# Patient Record
Sex: FEMALE | Race: WHITE | NOT HISPANIC OR LATINO | URBAN - METROPOLITAN AREA
[De-identification: names, ages, dates, MRNs, and addresses within clinical notes are randomized per-mention and may not be internally consistent; named-entity substitution may affect disease eponyms.]

---

## 2022-02-25 ENCOUNTER — INPATIENT (INPATIENT)
Facility: HOSPITAL | Age: 37
LOS: 17 days | Discharge: HOME | End: 2022-03-15
Attending: PSYCHIATRY & NEUROLOGY | Admitting: PSYCHIATRY & NEUROLOGY
Payer: MEDICARE

## 2022-02-25 VITALS
SYSTOLIC BLOOD PRESSURE: 141 MMHG | TEMPERATURE: 98 F | RESPIRATION RATE: 16 BRPM | HEART RATE: 98 BPM | OXYGEN SATURATION: 100 % | DIASTOLIC BLOOD PRESSURE: 90 MMHG

## 2022-02-25 DIAGNOSIS — F43.10 POST-TRAUMATIC STRESS DISORDER, UNSPECIFIED: ICD-10-CM

## 2022-02-25 DIAGNOSIS — F29 UNSPECIFIED PSYCHOSIS NOT DUE TO A SUBSTANCE OR KNOWN PHYSIOLOGICAL CONDITION: ICD-10-CM

## 2022-02-25 DIAGNOSIS — F25.0 SCHIZOAFFECTIVE DISORDER, BIPOLAR TYPE: ICD-10-CM

## 2022-02-25 LAB
ALBUMIN SERPL ELPH-MCNC: 4.7 G/DL — SIGNIFICANT CHANGE UP (ref 3.3–5)
ALP SERPL-CCNC: 46 U/L — SIGNIFICANT CHANGE UP (ref 40–120)
ALT FLD-CCNC: 9 U/L — SIGNIFICANT CHANGE UP (ref 4–33)
AMPHET UR-MCNC: NEGATIVE — SIGNIFICANT CHANGE UP
ANION GAP SERPL CALC-SCNC: 13 MMOL/L — SIGNIFICANT CHANGE UP (ref 7–14)
APAP SERPL-MCNC: <10 UG/ML — LOW (ref 15–25)
APPEARANCE UR: CLEAR — SIGNIFICANT CHANGE UP
AST SERPL-CCNC: 13 U/L — SIGNIFICANT CHANGE UP (ref 4–32)
B PERT DNA SPEC QL NAA+PROBE: SIGNIFICANT CHANGE UP
B PERT+PARAPERT DNA PNL SPEC NAA+PROBE: SIGNIFICANT CHANGE UP
BARBITURATES UR SCN-MCNC: NEGATIVE — SIGNIFICANT CHANGE UP
BASOPHILS # BLD AUTO: 0.05 K/UL — SIGNIFICANT CHANGE UP (ref 0–0.2)
BASOPHILS NFR BLD AUTO: 0.7 % — SIGNIFICANT CHANGE UP (ref 0–2)
BENZODIAZ UR-MCNC: NEGATIVE — SIGNIFICANT CHANGE UP
BILIRUB SERPL-MCNC: 0.8 MG/DL — SIGNIFICANT CHANGE UP (ref 0.2–1.2)
BILIRUB UR-MCNC: NEGATIVE — SIGNIFICANT CHANGE UP
BORDETELLA PARAPERTUSSIS (RAPRVP): SIGNIFICANT CHANGE UP
BUN SERPL-MCNC: 11 MG/DL — SIGNIFICANT CHANGE UP (ref 7–23)
C PNEUM DNA SPEC QL NAA+PROBE: SIGNIFICANT CHANGE UP
CALCIUM SERPL-MCNC: 9.6 MG/DL — SIGNIFICANT CHANGE UP (ref 8.4–10.5)
CHLORIDE SERPL-SCNC: 102 MMOL/L — SIGNIFICANT CHANGE UP (ref 98–107)
CO2 SERPL-SCNC: 22 MMOL/L — SIGNIFICANT CHANGE UP (ref 22–31)
COCAINE METAB.OTHER UR-MCNC: NEGATIVE — SIGNIFICANT CHANGE UP
COLOR SPEC: COLORLESS — SIGNIFICANT CHANGE UP
COVID-19 SPIKE DOMAIN AB INTERP: NEGATIVE — SIGNIFICANT CHANGE UP
COVID-19 SPIKE DOMAIN ANTIBODY RESULT: 0.4 U/ML — SIGNIFICANT CHANGE UP
CREAT SERPL-MCNC: 0.72 MG/DL — SIGNIFICANT CHANGE UP (ref 0.5–1.3)
CREATININE URINE RESULT, DAU: 27 MG/DL — SIGNIFICANT CHANGE UP
DIFF PNL FLD: NEGATIVE — SIGNIFICANT CHANGE UP
EOSINOPHIL # BLD AUTO: 0.06 K/UL — SIGNIFICANT CHANGE UP (ref 0–0.5)
EOSINOPHIL NFR BLD AUTO: 0.9 % — SIGNIFICANT CHANGE UP (ref 0–6)
ETHANOL SERPL-MCNC: <10 MG/DL — SIGNIFICANT CHANGE UP
FLUAV SUBTYP SPEC NAA+PROBE: SIGNIFICANT CHANGE UP
FLUBV RNA SPEC QL NAA+PROBE: SIGNIFICANT CHANGE UP
GLUCOSE SERPL-MCNC: 104 MG/DL — HIGH (ref 70–99)
GLUCOSE UR QL: NEGATIVE — SIGNIFICANT CHANGE UP
HADV DNA SPEC QL NAA+PROBE: SIGNIFICANT CHANGE UP
HCG SERPL-ACNC: <5 MIU/ML — SIGNIFICANT CHANGE UP
HCOV 229E RNA SPEC QL NAA+PROBE: SIGNIFICANT CHANGE UP
HCOV HKU1 RNA SPEC QL NAA+PROBE: SIGNIFICANT CHANGE UP
HCOV NL63 RNA SPEC QL NAA+PROBE: SIGNIFICANT CHANGE UP
HCOV OC43 RNA SPEC QL NAA+PROBE: SIGNIFICANT CHANGE UP
HCT VFR BLD CALC: 41.6 % — SIGNIFICANT CHANGE UP (ref 34.5–45)
HGB BLD-MCNC: 14 G/DL — SIGNIFICANT CHANGE UP (ref 11.5–15.5)
HMPV RNA SPEC QL NAA+PROBE: SIGNIFICANT CHANGE UP
HPIV1 RNA SPEC QL NAA+PROBE: SIGNIFICANT CHANGE UP
HPIV2 RNA SPEC QL NAA+PROBE: SIGNIFICANT CHANGE UP
HPIV3 RNA SPEC QL NAA+PROBE: SIGNIFICANT CHANGE UP
HPIV4 RNA SPEC QL NAA+PROBE: SIGNIFICANT CHANGE UP
IANC: 4.58 K/UL — SIGNIFICANT CHANGE UP (ref 1.5–8.5)
IMM GRANULOCYTES NFR BLD AUTO: 0.3 % — SIGNIFICANT CHANGE UP (ref 0–1.5)
KETONES UR-MCNC: NEGATIVE — SIGNIFICANT CHANGE UP
LEUKOCYTE ESTERASE UR-ACNC: NEGATIVE — SIGNIFICANT CHANGE UP
LYMPHOCYTES # BLD AUTO: 1.68 K/UL — SIGNIFICANT CHANGE UP (ref 1–3.3)
LYMPHOCYTES # BLD AUTO: 24.2 % — SIGNIFICANT CHANGE UP (ref 13–44)
M PNEUMO DNA SPEC QL NAA+PROBE: SIGNIFICANT CHANGE UP
MCHC RBC-ENTMCNC: 31.9 PG — SIGNIFICANT CHANGE UP (ref 27–34)
MCHC RBC-ENTMCNC: 33.7 GM/DL — SIGNIFICANT CHANGE UP (ref 32–36)
MCV RBC AUTO: 94.8 FL — SIGNIFICANT CHANGE UP (ref 80–100)
METHADONE UR-MCNC: NEGATIVE — SIGNIFICANT CHANGE UP
MONOCYTES # BLD AUTO: 0.56 K/UL — SIGNIFICANT CHANGE UP (ref 0–0.9)
MONOCYTES NFR BLD AUTO: 8.1 % — SIGNIFICANT CHANGE UP (ref 2–14)
NEUTROPHILS # BLD AUTO: 4.58 K/UL — SIGNIFICANT CHANGE UP (ref 1.8–7.4)
NEUTROPHILS NFR BLD AUTO: 65.8 % — SIGNIFICANT CHANGE UP (ref 43–77)
NITRITE UR-MCNC: NEGATIVE — SIGNIFICANT CHANGE UP
NRBC # BLD: 0 /100 WBCS — SIGNIFICANT CHANGE UP
NRBC # FLD: 0 K/UL — SIGNIFICANT CHANGE UP
OPIATES UR-MCNC: NEGATIVE — SIGNIFICANT CHANGE UP
OXYCODONE UR-MCNC: NEGATIVE — SIGNIFICANT CHANGE UP
PCP SPEC-MCNC: SIGNIFICANT CHANGE UP
PCP UR-MCNC: NEGATIVE — SIGNIFICANT CHANGE UP
PH UR: 7.5 — SIGNIFICANT CHANGE UP (ref 5–8)
PLATELET # BLD AUTO: 285 K/UL — SIGNIFICANT CHANGE UP (ref 150–400)
POTASSIUM SERPL-MCNC: 4.5 MMOL/L — SIGNIFICANT CHANGE UP (ref 3.5–5.3)
POTASSIUM SERPL-SCNC: 4.5 MMOL/L — SIGNIFICANT CHANGE UP (ref 3.5–5.3)
PROT SERPL-MCNC: 7.2 G/DL — SIGNIFICANT CHANGE UP (ref 6–8.3)
PROT UR-MCNC: NEGATIVE — SIGNIFICANT CHANGE UP
RAPID RVP RESULT: SIGNIFICANT CHANGE UP
RBC # BLD: 4.39 M/UL — SIGNIFICANT CHANGE UP (ref 3.8–5.2)
RBC # FLD: 12.2 % — SIGNIFICANT CHANGE UP (ref 10.3–14.5)
RSV RNA SPEC QL NAA+PROBE: SIGNIFICANT CHANGE UP
RV+EV RNA SPEC QL NAA+PROBE: SIGNIFICANT CHANGE UP
SALICYLATES SERPL-MCNC: <0.3 MG/DL — LOW (ref 15–30)
SARS-COV-2 IGG+IGM SERPL QL IA: 0.4 U/ML — SIGNIFICANT CHANGE UP
SARS-COV-2 IGG+IGM SERPL QL IA: NEGATIVE — SIGNIFICANT CHANGE UP
SARS-COV-2 RNA SPEC QL NAA+PROBE: SIGNIFICANT CHANGE UP
SODIUM SERPL-SCNC: 137 MMOL/L — SIGNIFICANT CHANGE UP (ref 135–145)
SP GR SPEC: 1.01 — SIGNIFICANT CHANGE UP (ref 1–1.05)
THC UR QL: NEGATIVE — SIGNIFICANT CHANGE UP
TOXICOLOGY SCREEN, DRUGS OF ABUSE, SERUM RESULT: SIGNIFICANT CHANGE UP
TSH SERPL-MCNC: 1.4 UIU/ML — SIGNIFICANT CHANGE UP (ref 0.27–4.2)
UROBILINOGEN FLD QL: SIGNIFICANT CHANGE UP
WBC # BLD: 6.95 K/UL — SIGNIFICANT CHANGE UP (ref 3.8–10.5)
WBC # FLD AUTO: 6.95 K/UL — SIGNIFICANT CHANGE UP (ref 3.8–10.5)

## 2022-02-25 PROCEDURE — 93010 ELECTROCARDIOGRAM REPORT: CPT

## 2022-02-25 PROCEDURE — 99285 EMERGENCY DEPT VISIT HI MDM: CPT | Mod: 25

## 2022-02-25 RX ORDER — VENLAFAXINE HCL 75 MG
37.5 CAPSULE, EXT RELEASE 24 HR ORAL DAILY
Refills: 0 | Status: DISCONTINUED | OUTPATIENT
Start: 2022-02-25 | End: 2022-02-28

## 2022-02-25 RX ORDER — HALOPERIDOL DECANOATE 100 MG/ML
5 INJECTION INTRAMUSCULAR EVERY 6 HOURS
Refills: 0 | Status: DISCONTINUED | OUTPATIENT
Start: 2022-02-25 | End: 2022-03-15

## 2022-02-25 RX ORDER — DIPHENHYDRAMINE HCL 50 MG
50 CAPSULE ORAL EVERY 6 HOURS
Refills: 0 | Status: DISCONTINUED | OUTPATIENT
Start: 2022-02-25 | End: 2022-03-15

## 2022-02-25 RX ORDER — DIPHENHYDRAMINE HCL 50 MG
50 CAPSULE ORAL ONCE
Refills: 0 | Status: DISCONTINUED | OUTPATIENT
Start: 2022-02-25 | End: 2022-03-15

## 2022-02-25 RX ORDER — VENLAFAXINE HCL 75 MG
37.5 CAPSULE, EXT RELEASE 24 HR ORAL DAILY
Refills: 0 | Status: DISCONTINUED | OUTPATIENT
Start: 2022-02-25 | End: 2022-02-25

## 2022-02-25 RX ORDER — ARIPIPRAZOLE 15 MG/1
15 TABLET ORAL DAILY
Refills: 0 | Status: DISCONTINUED | OUTPATIENT
Start: 2022-02-25 | End: 2022-03-01

## 2022-02-25 RX ORDER — HALOPERIDOL DECANOATE 100 MG/ML
5 INJECTION INTRAMUSCULAR ONCE
Refills: 0 | Status: DISCONTINUED | OUTPATIENT
Start: 2022-02-25 | End: 2022-03-15

## 2022-02-25 RX ADMIN — Medication 2 MILLIGRAM(S): at 21:41

## 2022-02-25 RX ADMIN — Medication 2 MILLIGRAM(S): at 12:50

## 2022-02-25 RX ADMIN — ARIPIPRAZOLE 15 MILLIGRAM(S): 15 TABLET ORAL at 21:41

## 2022-02-25 NOTE — ED BEHAVIORAL HEALTH ASSESSMENT NOTE - OTHER PAST PSYCHIATRIC HISTORY (INCLUDE DETAILS REGARDING ONSET, COURSE OF ILLNESS, INPATIENT/OUTPATIENT TREATMENT)
Multiple psych hospitalizations. Last one in 10/2021 in [place] for one week  Currently in treatment [facility] in [place], [phone]

## 2022-02-25 NOTE — ED PROVIDER NOTE - CROS ED ROS STATEMENT
I spoke with pt and she staes she has been taking abx since 7/19/19 all other ROS negative except as per HPI

## 2022-02-25 NOTE — ED BEHAVIORAL HEALTH ASSESSMENT NOTE - RISK ASSESSMENT
High Acute Suicide Risk risk factors include recent aggression, recent non-adherence with meds, psychosis with recent suicidal thoughts , not caring for self, disorganized thought process and insomnia.   protective factors include a supportive family, good access to treatment and no substance use.   pt elevated harm to others and self and requires hospitalization.

## 2022-02-25 NOTE — ED PROVIDER NOTE - OBJECTIVE STATEMENT
37 y/o  F BIBA secondary to dena and paranoia .  Appears internally preoccupied. Denies SI/HI/AH/VH. Denies  falling, punching or kicking any objects. Denies pain, SOB, fever, chills, chest/abdominal discomfort. No evidence of physical  injuries, broken skin  or deformities. Denies recent use of alcohol or illicit drugs.

## 2022-02-25 NOTE — ED BEHAVIORAL HEALTH ASSESSMENT NOTE - PSYCHIATRIC ISSUES AND PLAN (INCLUDE STANDING AND PRN MEDICATION)
c/w effexor 37.5mg po daily, Abilify 15mg po daily. PRNS: Haldol 5mg po/im q6hrs, ativan 2mg po/im q6hrs prn and benadryl 50mg po/im q6hrs prn

## 2022-02-25 NOTE — ED BEHAVIORAL HEALTH ASSESSMENT NOTE - DESCRIPTION
labile, crying hysterically at times and then blunted affect. Received Ativan 2mg po 1x dose  Vital Signs Last 24 Hrs  T(C): 36.4 (25 Feb 2022 12:02), Max: 36.4 (25 Feb 2022 12:02)  T(F): 97.5 (25 Feb 2022 12:02), Max: 97.5 (25 Feb 2022 12:02)  HR: 98 (25 Feb 2022 12:02) (98 - 98)  BP: 141/90 (25 Feb 2022 12:02) (141/90 - 141/90)  BP(mean): --  RR: 16 (25 Feb 2022 12:02) (16 - 16)  SpO2: 100% (25 Feb 2022 12:02) (100% - 100%) none lives in [place] with , no children, unemployed for the last 10 years

## 2022-02-25 NOTE — ED BEHAVIORAL HEALTH ASSESSMENT NOTE - DIFFERENTIAL
schizoaffective disorder vs. schizophrenia vs. bipolar with psychotic features vs. mdd with psychosis

## 2022-02-25 NOTE — ED ADULT TRIAGE NOTE - CHIEF COMPLAINT QUOTE
Pt states that she has been getting "messages from the government", pt will not elaborate, appears preoccupied and paranoid.  PMH schizo-affective, hospitalized in [place] 10/21
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
## 2022-02-25 NOTE — ED BEHAVIORAL HEALTH ASSESSMENT NOTE - SUMMARY
36YR old  F, domiciled with spouse in [place], unemployed with a past psych hx of schizoaffective disorder, PTSD, multiple hospitalizations, Last Hospitalization was at [facility] in Oct 2021 for a week, currently in outpatient treatment at [facility] in [place], [place] no past SA, no nssib, no medical hx, no substance use was BIB family for bizarre and delusional behavior.  Pt presenting with symptoms of active psychosis including paranoid delusions, disorganized thought process and most likely having AH in the context of med non-adherence. Patient has not been functioning at her baseline, not sleeping, has been aggressive with family and at this time is presenting as an acute danger towards others and will require hospitalization.

## 2022-02-25 NOTE — ED ADULT NURSE NOTE - OBJECTIVE STATEMENT
Pt BIB family for worsening paranoia.  Pt states that she's here because the government sanctioned(punished) her.  Pt is very guarded and would not expound upon what that meant, tearful.  Pt states that the sanctions are causing her to think about killing herself but she denies auditory and visual hallucinations.  Pt denies current SI/HI, AH/VH, ETOH, substance use. Pt BIB family for worsening paranoia.  Pt states that she's here because the government sanctioned(punished) her.  Pt is very guarded and would not expound upon what that meant, tearful.  Pt states that the sanctions are causing her to think about killing herself but she denies auditory and visual hallucinations and said she's not schizophrenic.  Pt denies current SI/HI, AH/VH, ETOH, substance use.

## 2022-02-25 NOTE — ED BEHAVIORAL HEALTH NOTE - BEHAVIORAL HEALTH NOTE
COVID Exposure Screen- Patient  1.	*Have you had a COVID-19 test in the last 90 days?  (  ) Yes   (x  ) No   (  ) Unknown- Reason: _____  IF YES PROCEED TO QUESTION #2. IF NO OR UNKNOWN, PLEASE SKIP TO QUESTION #3.  2.	Date of test(s) and result(s): ________  3.	*Have you tested positive for COVID-19 antibodies? (  ) Yes   (x  ) No   (  ) Unknown- Reason: _____  IF YES PROCEED TO QUESTION #4. IF NO or UNKNOWN, PLEASE SKIP TO QUESTION #5.  4.	Date of positive antibody test: ________  5.	*Have you received 2 doses of the COVID-19 vaccine? (  ) Yes   ( x ) No   (  ) Unknown- Reason: _____   IF YES PROCEED TO QUESTION #6. IF NO or UNKNOWN, PLEASE SKIP TO QUESTION #7.  6.	Date of second dose: ________  7.	*In the past 10 days, have you been around anyone with a positive COVID-19 test?* (  ) Yes   (  x) No   (  ) Unknown- Reason: ____  IF YES PROCEED TO QUESTION #8. IF NO or UNKNOWN, PLEASE SKIP TO QUESTION #13.  8.	Were you within 6 feet of them for at least 15 minutes? (  ) Yes   (  ) No   (  ) Unknown- Reason: _____  9.	Have you provided care for them? (  ) Yes   (  ) No   (  ) Unknown- Reason: ______  10.	Have you had direct physical contact with them (touched, hugged, or kissed them)? (  ) Yes   (  ) No    (  ) Unknown- Reason: _____  11.	Have you shared eating or drinking utensils with them? (  ) Yes   (  ) No    (  ) Unknown- Reason: ____  12.	Have they sneezed, coughed, or somehow gotten respiratory droplets on you? (  ) Yes   (  ) No    (  ) Unknown- Reason: ______  13.	*Have you been out of [place] within the past 10 days?* (x  ) Yes   (  ) No   (  ) Unknown- Reason: _____  IF YES PLEASE ANSWER THE FOLLOWING QUESTIONS:  14.	Which state/country have you been to? _[place]_____  15.	Were you there over 24 hours? ( x ) Yes   (  ) No    (  ) Unknown- Reason: ______  16.	Date of return to [facility]: __02/25/22____

## 2022-02-25 NOTE — ED BEHAVIORAL HEALTH ASSESSMENT NOTE - VIOLENCE RISK FACTORS:
Feeling of being under threat and being unable to control threat/Affective dysregulation/Impulsivity/Noncompliance with treatment

## 2022-02-25 NOTE — ED PROVIDER NOTE - CLINICAL SUMMARY MEDICAL DECISION MAKING FREE TEXT BOX
Labs, Urine Tox/UA, EKG  Medical evaluation performed. There is no clinical evidence of intoxication or any acute medical problem requiring immediate intervention. Patient is awaiting psychiatric consultation. Final disposition will be determined by psychiatrist.

## 2022-02-26 RX ADMIN — Medication 50 MILLIGRAM(S): at 09:37

## 2022-02-26 RX ADMIN — Medication 37.5 MILLIGRAM(S): at 08:31

## 2022-02-26 RX ADMIN — ARIPIPRAZOLE 15 MILLIGRAM(S): 15 TABLET ORAL at 08:31

## 2022-02-26 RX ADMIN — HALOPERIDOL DECANOATE 5 MILLIGRAM(S): 100 INJECTION INTRAMUSCULAR at 15:39

## 2022-02-26 RX ADMIN — Medication 2 MILLIGRAM(S): at 16:32

## 2022-02-26 RX ADMIN — Medication 50 MILLIGRAM(S): at 15:39

## 2022-02-26 RX ADMIN — HALOPERIDOL DECANOATE 5 MILLIGRAM(S): 100 INJECTION INTRAMUSCULAR at 09:37

## 2022-02-26 NOTE — BH INPATIENT PSYCHIATRY ASSESSMENT NOTE - HPI (INCLUDE ILLNESS QUALITY, SEVERITY, DURATION, TIMING, CONTEXT, MODIFYING FACTORS, ASSOCIATED SIGNS AND SYMPTOMS)
Patient was seen and evaluated, chart reviewed. Case discussed with nursing team.  On service for this 36 year old  female domiciled with spouse in [place], unemployed with a past psych hx of Schizoaffective disorder, and PTSD.  Patient is hospitalized with psychotic decompensation, increase in bizarre and delusional behaviors. Patient admitted to  on a 9.39 legal status. I have reviewed the initial psychiatric assessment in the electronic medical record, including the history of present illness, past psychiatric history, family/social history (no pertinent changes), and exam, and have confirmed the salient findings dated 22.    As per chart review, transferring records indicated the followinYR old  F, domiciled with spouse in [place], unemployed with a past psych hx of schizoaffective disorder, PTSD, multiple hospitalizations, Last Hospitalization was at [facility] in Oct 2021 for a week, currently in outpatient treatment at [facility] in [place], [place] no past SA, no nssib, no medical hx, no substance use was BIB family for bizarre and delusional behavior.   Patient is labile and tangential during the interview. She starts crying when discussing the government sanctions that have been placed on her and it's leading her to possibly have a "forced suicide". She states the government has placed sanctions on her so she would take her medication and she was ordered by them to come to this hospital. She also admits that she only recently started taking her medication (Effexor 37.5mg and Abilify 15mg po) as she had been off of them for a long time because of "the health issues that she could get from them". Patient states that the government is also watching her through difference cameras planted in her home and around her home. She states they've been controlling her movements (unclear how) and they will "paralyze her legs" or "cause her to shake". Patient states they're inflicting psychological warfare on her and "the war is so severe" but does not state it's been making her feel depressed.   Patient admits that when she was not taking her medications she was not sleeping but since restarting both the medications her sleep has improved. She is endorsing grandiose delusions stating that "I am Covid" and says everytime someone mentions COVID on tv, they're talking about her. She also feels that the COVID vaccine has AIDS in it and will refuse to get it. Patient is denying any SI/I/P at this time and is also denying any HI/I/P. She did at a point, look up to the ceiling and said "STOP PARALYZING ME!" but denied any auditory hallucinations.       On unit:  Information Received From: Chart review and patient interview         Patient was seen and evaluated, chart reviewed. Case discussed with nursing team.  On service for this 36 year old  female domiciled with spouse in [place], unemployed with a past psych hx of Schizoaffective disorder, and PTSD.  Patient is hospitalized with psychotic decompensation, increase in bizarre and delusional behaviors. Patient admitted to  on a 9.39 legal status. I have reviewed the initial psychiatric assessment in the electronic medical record, including the history of present illness, past psychiatric history, family/social history (no pertinent changes), and exam, and have confirmed the salient findings dated 22.    As per chart review, transferring records indicated the followinYR old  F, domiciled with spouse in [place], unemployed with a past psych hx of schizoaffective disorder, PTSD, multiple hospitalizations, Last Hospitalization was at [facility] in Oct 2021 for a week, currently in outpatient treatment at [facility] in [place], [place] no past SA, no nssib, no medical hx, no substance use was BIB family for bizarre and delusional behavior.   Patient is labile and tangential during the interview. She starts crying when discussing the government sanctions that have been placed on her and it's leading her to possibly have a "forced suicide". She states the government has placed sanctions on her so she would take her medication and she was ordered by them to come to this hospital. She also admits that she only recently started taking her medication (Effexor 37.5mg and Abilify 15mg po) as she had been off of them for a long time because of "the health issues that she could get from them". Patient states that the government is also watching her through difference cameras planted in her home and around her home. She states they've been controlling her movements (unclear how) and they will "paralyze her legs" or "cause her to shake". Patient states they're inflicting psychological warfare on her and "the war is so severe" but does not state it's been making her feel depressed.   Patient admits that when she was not taking her medications she was not sleeping but since restarting both the medications her sleep has improved. She is endorsing grandiose delusions stating that "I am Covid" and says everytime someone mentions COVID on tv, they're talking about her. She also feels that the COVID vaccine has AIDS in it and will refuse to get it. Patient is denying any SI/I/P at this time and is also denying any HI/I/P. She did at a point, look up to the ceiling and said "STOP PARALYZING ME!" but denied any auditory hallucinations.       On unit:  Initial interview, patient is followed up for psychotic decompensation.  Chart, medications and admission labs reviewed, with no acute findings.  Patient is discussed with nursing staff. Per nursing report patient remains compliant with all standing medications. Patient remains in good behavioral control, there has been no aggression, no prns for aggression. No significant overnight issues.    Patient is observed in dayroom, walking in holding her bags of belongings.  She is notably guarded upon approach, but agrees to interview. When questioned about her mood pt reports that she feels “anxious”  In regards to psychotic symptoms, there is an extended pause then pt reports “I don’t have a psychiatric problem”.  Attempted to discuss precipitating events leading to current admission and why she is here pt replies “I have sanctions and there severe sanctions from the government”  Patient immediately becomes tearful, she is labile and tangential during the interview. She starts crying when discussing the government sanctions that have been placed on her. She states the government has placed sanctions on her so she would take her medication and she was ordered by the government to come to this hospital. Patient reports “there’s a war and I’m in the middle of the war” Patient is asked between who, she replies “Mitul and all the celebrities they play this game and they use me as a shield and if they stop I’m going to die”   Patient reports the government is also involved in this war and it's leading her to possibly have a "forced suicide". Patient reports that this “war” has been going on for several months “they programed my mind to think about these games” Denies hearing voices. Patient is asked why she thinks she was picked to play these games, to which she replies “because I understand the government in a different way than most people, I know special things, I speak in special code” Patient abruptly stops talking then states “I’m not telling you anymore information about that” Patient then begins to shake her body violently in the chair stating “this is them controlling controling my body, whenever they hear me talking to someone they make me have a seizure so I can stop talking”   Regarding her medication compliance pt reports “I was ordered by the government by the feds to take them because the celebrities complained about me breathing the science”  Patient denies SI/SIB/HI “I would never kill myself  unless the government approved it”  Denies substance abuse. Attempts made to discuss alternative antipsychotic medications, pt reports that her current medications "are approved sanctions by the government, they kept their promise I do't want to change it"     Information Received From: Chart review and patient interview         Patient was seen and evaluated, chart reviewed. Case discussed with nursing team.  On service for this 36 year old  female domiciled with spouse in [place], unemployed with a past psych hx of Schizoaffective disorder, and PTSD.  Patient is hospitalized with psychotic decompensation, increase in bizarre and delusional behaviors. Patient admitted to  on a 9.39 legal status. I have reviewed the initial psychiatric assessment in the electronic medical record, including the history of present illness, past psychiatric history, family/social history (no pertinent changes), and exam, and have confirmed the salient findings dated 22.    As per chart review, transferring records indicated the followinYR old  F, domiciled with spouse in [place], unemployed with a past psych hx of schizoaffective disorder, PTSD, multiple hospitalizations, Last Hospitalization was at [facility] in Oct 2021 for a week, currently in outpatient treatment at [facility] in [place], [place] no past SA, no nssib, no medical hx, no substance use was BIB family for bizarre and delusional behavior.   Patient is labile and tangential during the interview. She starts crying when discussing the government sanctions that have been placed on her and it's leading her to possibly have a "forced suicide". She states the government has placed sanctions on her so she would take her medication and she was ordered by them to come to this hospital. She also admits that she only recently started taking her medication (Effexor 37.5mg and Abilify 15mg po) as she had been off of them for a long time because of "the health issues that she could get from them". Patient states that the government is also watching her through difference cameras planted in her home and around her home. She states they've been controlling her movements (unclear how) and they will "paralyze her legs" or "cause her to shake". Patient states they're inflicting psychological warfare on her and "the war is so severe" but does not state it's been making her feel depressed.   Patient admits that when she was not taking her medications she was not sleeping but since restarting both the medications her sleep has improved. She is endorsing grandiose delusions stating that "I am Covid" and says everytime someone mentions COVID on tv, they're talking about her. She also feels that the COVID vaccine has AIDS in it and will refuse to get it. Patient is denying any SI/I/P at this time and is also denying any HI/I/P. She did at a point, look up to the ceiling and said "STOP PARALYZING ME!" but denied any auditory hallucinations.       On unit:  Initial interview, patient is followed up for psychotic decompensation.  Chart, medications and admission labs reviewed, with no acute findings.  Patient is discussed with nursing staff. Per nursing report patient remains compliant with all standing medications. Patient remains in good behavioral control, there has been no aggression, no prns for aggression. No significant overnight issues.    Patient is observed in dayroom, walking in holding her bags of belongings.  She is notably guarded upon approach, but agrees to interview. When questioned about her mood pt reports that she feels “anxious”  In regards to psychotic symptoms, there is an extended pause then pt reports “I don’t have a psychiatric problem”.  Attempted to discuss precipitating events leading to current admission and why she is here pt replies “I have sanctions and there severe sanctions from the government”  Patient immediately becomes tearful, she is labile and tangential during the interview. She starts crying when discussing the government sanctions that have been placed on her. She states the government has placed sanctions on her so she would take her medication and she was ordered by the government to come to this hospital. Patient reports “there’s a war and I’m in the middle of the war” Patient is asked between who, she replies “Mitul and all the celebrities they play this game and they use me as a shield and if they stop I’m going to die”   Patient reports the government is also involved in this war and it's leading her to possibly have a "forced suicide". Patient reports that this “war” has been going on for several months “they programed my mind to think about these games” Denies hearing voices. Patient is asked why she thinks she was picked to play these games, to which she replies “because I understand the government in a different way than most people, I know special things, I speak in special code” Patient abruptly stops talking then states “I’m not telling you anymore information about that” Patient then begins to shake her body violently in the chair stating “this is them controlling controling my body, whenever they hear me talking to someone they make me have a seizure so I can stop talking”   Regarding her medication compliance pt reports “I was ordered by the government by the feds to take them because the celebrities complained about me breathing the science”  Patient denies SI/SIB/HI “I would never kill myself  unless the government approved it”  Denies substance abuse. Attempts made to discuss alternative antipsychotic medications, pt reports that her current medications "are approved sanctions by the government, they kept their promise I do't want to change it"

## 2022-02-26 NOTE — BH INPATIENT PSYCHIATRY ASSESSMENT NOTE - NSBHCHARTREVIEWVS_PSY_A_CORE FT
Vital Signs Last 24 Hrs  T(C): 36.5 (02-26-22 @ 06:43), Max: 37 (02-25-22 @ 16:50)  T(F): 97.7 (02-26-22 @ 06:43), Max: 98.6 (02-25-22 @ 16:50)  HR: 85 (02-26-22 @ 06:43) (85 - 105)  BP: 136/73 (02-26-22 @ 06:43) (121/78 - 141/90)  BP(mean): 110 (02-25-22 @ 16:50) (110 - 110)  RR: 18 (02-25-22 @ 15:54) (16 - 18)  SpO2: 100% (02-25-22 @ 15:54) (100% - 100%)     Vital Signs Last 24 Hrs  T(C): 36.7 (02-26-22 @ 20:10), Max: 36.7 (02-26-22 @ 20:10)  T(F): 98 (02-26-22 @ 20:10), Max: 98 (02-26-22 @ 20:10)  HR: --  BP: --  BP(mean): --  RR: --  SpO2: --

## 2022-02-26 NOTE — BH INPATIENT PSYCHIATRY ASSESSMENT NOTE - RISK ASSESSMENT
risk factors include recent aggression, recent non-adherence with meds, psychosis with recent suicidal thoughts , not caring for self, disorganized thought process and insomnia.   protective factors include a supportive family, good access to treatment and no substance use.   pt elevated harm to others and self and requires hospitalization.

## 2022-02-26 NOTE — PSYCHIATRIC REHAB INITIAL EVALUATION - NSBHPRRECOMMEND_PSY_ALL_CORE
Writer attempted to meet with patient in order to orient the patient to the unit, provide her with a copy of the unit schedule, and introduce her to department staff and department functions, however the patient was asleep after taking medication. Patient was hospitalized due to worsening psychosis in the context of medication noncompliance. Patient also reported suicidal thoughts, and sx of anxiety. Patient has delusional beliefs that the government is spying on her and controlling her movements. On arrival to the unit, patient was reported to be labile and tearful.   Patient and writer were unable to establish a collaborative rehabilitation goal, therefore an appropriate goal will be assigned to the patient. Psychiatric rehabilitation staff will continue to provide ongoing support and encouragement.
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
## 2022-02-26 NOTE — BH INPATIENT PSYCHIATRY ASSESSMENT NOTE - CURRENT MEDICATION
MEDICATIONS  (STANDING):  ARIPiprazole 15 milliGRAM(s) Oral daily  venlafaxine XR 37.5 milliGRAM(s) Oral daily    MEDICATIONS  (PRN):  diphenhydrAMINE 50 milliGRAM(s) Oral every 6 hours PRN eps ppx/agitation  diphenhydrAMINE Injectable 50 milliGRAM(s) IntraMuscular once PRN eps ppx/severe agitationeps ppx/severe agitation  haloperidol     Tablet 5 milliGRAM(s) Oral every 6 hours PRN agitation  haloperidol    Injectable 5 milliGRAM(s) IntraMuscular once PRN severe agitation  LORazepam     Tablet 2 milliGRAM(s) Oral every 6 hours PRN Agitation  LORazepam   Injectable 2 milliGRAM(s) IntraMuscular Once PRN severe agitation

## 2022-02-26 NOTE — BH INPATIENT PSYCHIATRY ASSESSMENT NOTE - OTHER PAST PSYCHIATRIC HISTORY (INCLUDE DETAILS REGARDING ONSET, COURSE OF ILLNESS, INPATIENT/OUTPATIENT TREATMENT)
Multiple psych hospitalizations. Last one in 10/2021 in [place] for one week  Currently in treatment [facility] in [place], [phone]

## 2022-02-26 NOTE — BH INPATIENT PSYCHIATRY ASSESSMENT NOTE - OTHER
pt denies AH but responds to stimuli extremely bizarre, oddly related, but cooperative during interview

## 2022-02-27 LAB
A1C WITH ESTIMATED AVERAGE GLUCOSE RESULT: 5 % — SIGNIFICANT CHANGE UP (ref 4–5.6)
CHOLEST SERPL-MCNC: 250 MG/DL — HIGH
ESTIMATED AVERAGE GLUCOSE: 97 — SIGNIFICANT CHANGE UP
HDLC SERPL-MCNC: 86 MG/DL — SIGNIFICANT CHANGE UP
LIPID PNL WITH DIRECT LDL SERPL: 154 MG/DL — HIGH
NON HDL CHOLESTEROL: 164 MG/DL — HIGH
TRIGL SERPL-MCNC: 48 MG/DL — SIGNIFICANT CHANGE UP

## 2022-02-27 PROCEDURE — 99232 SBSQ HOSP IP/OBS MODERATE 35: CPT

## 2022-02-27 RX ADMIN — HALOPERIDOL DECANOATE 5 MILLIGRAM(S): 100 INJECTION INTRAMUSCULAR at 09:53

## 2022-02-27 RX ADMIN — Medication 50 MILLIGRAM(S): at 18:01

## 2022-02-27 RX ADMIN — Medication 37.5 MILLIGRAM(S): at 08:16

## 2022-02-27 RX ADMIN — ARIPIPRAZOLE 15 MILLIGRAM(S): 15 TABLET ORAL at 08:16

## 2022-02-27 NOTE — BH INPATIENT PSYCHIATRY PROGRESS NOTE - NSBHCHARTREVIEWVS_PSY_A_CORE FT
Vital Signs Last 24 Hrs  T(C): 36.8 (02-27-22 @ 07:50), Max: 36.8 (02-27-22 @ 07:50)  T(F): 98.2 (02-27-22 @ 07:50), Max: 98.2 (02-27-22 @ 07:50)  HR: --  BP: --  BP(mean): --  RR: --  SpO2: --    Orthostatic VS  02-27-22 @ 07:50  Lying BP: --/-- HR: --  Sitting BP: 118/73 HR: 88  Standing BP: --/-- HR: --  Site: --  Mode: --

## 2022-02-27 NOTE — BH INPATIENT PSYCHIATRY PROGRESS NOTE - NSBHASSESSSUMMFT_PSY_ALL_CORE
Plan:  >Legal: 9.39  >Obs: Routine checks appropriate--visible on the unit, no history of aggression on inpatient unit.  No current SI. No need for CO, patient not expected to pose risk to self or others in controlled inpatient setting.  >Psychiatric Meds: outpatient medication regimen: effexor 37.5mg and Abilify 15mg daily. Observe for tolerability and efficacy. Hold antipsychotics if QTc >500  > Labs: Admission labs were reviewed, unremarkable. Follow up with Medical team as needed.   >Medical Comorbidities: No acute concerns. No consultations needed at this time. Patient with consistently stable VS,. Admission labs reviewed, no acute findings.   >Diet: Regular  >Social: milieu therapy  >Treatment Interventions: Groups and Individual Therapy/CBT, Motivational counseling for substance abuse related issues.   >Dispo: Collateral and dispo planning pending further symptom and medication optimization

## 2022-02-27 NOTE — BH INPATIENT PSYCHIATRY PROGRESS NOTE - NSBHFUPINTERVALHXFT_PSY_A_CORE
Per chart review:  36YR old  F, domiciled with spouse in [place], unemployed with a past psych hx of schizoaffective disorder, PTSD, multiple hospitalizations, Last Hospitalization was at [facility] in Oct 2021 for a week, currently in outpatient treatment at [facility] in [place], [place] no past SA, no nssib, no medical hx, no substance use was BIB family for bizarre and delusional behavior. She also admits that she only recently started taking her medication (Effexor 37.5mg and Abilify 15mg po) as she had been off of them for a long time because of "the health issues that she could get from them". Patient states that the government is also watching her through difference cameras planted in her home and around her home. She states they've been controlling her movements (unclear how) and they will "paralyze her legs" or "cause her to shake". Patient states they're inflicting psychological warfare on her and "the war is so severe" but does not state it's been making her feel depressed. Patient admits that when she was not taking her medications she was not sleeping but since restarting both the medications her sleep has improved. She is endorsing grandiose delusions stating that "I am Covid" and says everytime someone mentions COVID on tv, they're talking about her. She also feels that the COVID vaccine has AIDS in it and will refuse to get it. Patient is denying any SI/I/P at this time and is also denying any HI/I/P. She did at a point, look up to the ceiling and said "STOP PARALYZING ME!" but denied any auditory hallucinations.   On unit:  Patient is followed up for psychotic decompensation.  Chart, medications and admission labs reviewed, with no acute findings.  Patient is discussed with nursing staff. No interval events. Per nursing report patient remains compliant with standing medications. Patient remains in good behavioral control, there has been no aggression, no prns for aggression. Patient is eating and sleeping well.  Patient is observed in dayroom, walking in holding her bags of belongings. Patient remains disorganized, delusional, with ongoing perceptual disturbances. Psychotic symptoms successfully elicited.  Continues to manifest illogical in TP, has impaired reasoning, remains affectively dysregulated, delusional appears internally stimulated. Remains poor insight and judgement. Symptoms continues to impact pts functionality,  +bizarre remarks. Psychotic symptoms evident is same intensity as yesterday.  She states the government has placed sanctions on her so she would take her medication and she was ordered by the government to come to this hospital. Patient reports “there’s a war and I’m in the middle of the war”  Patient reports the government is involved in this war and it's leading her to possibly have a "forced suicide". Patient reports “My purpose here is to stop the war, only I can stop the war” Patient reports that she needs a therapist with high level government clearance to talk to because what she has to reveal is government classified.   Regarding her medication compliance pt reports “I was ordered by the government by the feds to take them because the celebrities complained about me breathing the science”  Patient denies SI/SIB/HI/AVH. Continue to provide therapeutic support.

## 2022-02-28 PROCEDURE — 99232 SBSQ HOSP IP/OBS MODERATE 35: CPT

## 2022-02-28 RX ORDER — VENLAFAXINE HCL 75 MG
75 CAPSULE, EXT RELEASE 24 HR ORAL DAILY
Refills: 0 | Status: DISCONTINUED | OUTPATIENT
Start: 2022-03-01 | End: 2022-03-02

## 2022-02-28 RX ADMIN — HALOPERIDOL DECANOATE 5 MILLIGRAM(S): 100 INJECTION INTRAMUSCULAR at 11:19

## 2022-02-28 RX ADMIN — Medication 37.5 MILLIGRAM(S): at 08:41

## 2022-02-28 RX ADMIN — ARIPIPRAZOLE 15 MILLIGRAM(S): 15 TABLET ORAL at 08:41

## 2022-02-28 RX ADMIN — Medication 2 MILLIGRAM(S): at 15:18

## 2022-02-28 NOTE — BH INPATIENT PSYCHIATRY PROGRESS NOTE - OTHER
extremely bizarre, oddly related, but mostly cooperative during interview AH suspected, but she denies

## 2022-02-28 NOTE — BH INPATIENT PSYCHIATRY PROGRESS NOTE - NSBHCHARTREVIEWVS_PSY_A_CORE FT
Vital Signs Last 24 Hrs  T(C): 36.8 (02-28-22 @ 07:46), Max: 36.8 (02-28-22 @ 07:46)  T(F): 98.2 (02-28-22 @ 07:46), Max: 98.2 (02-28-22 @ 07:46)  HR: 77 (02-28-22 @ 07:46) (77 - 77)  BP: 114/63 (02-28-22 @ 07:46) (114/63 - 114/63)  BP(mean): --  RR: --  SpO2: --    Orthostatic VS  02-27-22 @ 07:50  Lying BP: --/-- HR: --  Sitting BP: 118/73 HR: 88  Standing BP: --/-- HR: --  Site: --  Mode: --
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
## 2022-02-28 NOTE — BH INPATIENT PSYCHIATRY PROGRESS NOTE - CURRENT MEDICATION
MEDICATIONS  (STANDING):  ARIPiprazole 15 milliGRAM(s) Oral daily    MEDICATIONS  (PRN):  diphenhydrAMINE 50 milliGRAM(s) Oral every 6 hours PRN eps ppx/agitation  diphenhydrAMINE Injectable 50 milliGRAM(s) IntraMuscular once PRN eps ppx/severe agitationeps ppx/severe agitation  haloperidol     Tablet 5 milliGRAM(s) Oral every 6 hours PRN agitation  haloperidol    Injectable 5 milliGRAM(s) IntraMuscular once PRN severe agitation  LORazepam     Tablet 2 milliGRAM(s) Oral every 6 hours PRN Agitation  LORazepam   Injectable 2 milliGRAM(s) IntraMuscular Once PRN severe agitation

## 2022-02-28 NOTE — BH INPATIENT PSYCHIATRY PROGRESS NOTE - NSBHASSESSSUMMFT_PSY_ALL_CORE
Pt disorganized, labile, with delusions and depressed mood    Plan:  >Legal: 9.39  >Obs: Routine checks appropriate--visible on the unit, no history of aggression on inpatient unit.  No current SI. No need for CO, patient not expected to pose risk to self or others in controlled inpatient setting.  >Psychiatric Meds: outpatient medication regimen: Effexor XR titrated to 75mg PO daily and Abilify 15mg daily  > Labs: Admission labs were reviewed, unremarkable. Follow up with Medical team as needed.   >Medical Comorbidities: No acute concerns. No consultations needed at this time. Patient with consistently stable VS,. Admission labs reviewed, no acute findings.   >Diet: Regular  >Social: milieu therapy  >Treatment Interventions: Groups and Individual Therapy/CBT, Motivational counseling for substance abuse related issues.   >Dispo: Collateral and dispo planning pending further symptom and medication optimization

## 2022-02-28 NOTE — BH INPATIENT PSYCHIATRY PROGRESS NOTE - NSBHMETABOLIC_PSY_ALL_CORE_FT
BMI:   HbA1c: A1C with Estimated Average Glucose Result: 5.0 % (02-27-22 @ 10:45)    Glucose:   BP: 114/63 (02-28-22 @ 07:46) (114/63 - 136/73)  Lipid Panel: Date/Time: 02-27-22 @ 10:45  Cholesterol, Serum: 250  Direct LDL: --  HDL Cholesterol, Serum: 86  Total Cholesterol/HDL Ration Measurement: --  Triglycerides, Serum: 48

## 2022-02-28 NOTE — BH INPATIENT PSYCHIATRY PROGRESS NOTE - NSBHFUPINTERVALHXFT_PSY_A_CORE
Pt compliant with medication and tolerating it well.  Chart reviewed and case discussed with treatment team.  No events reported overnight.  Pt on interview is labile, tearful and disorganized.  She reports she came to the hospital because she was sanctioned by the government.  She reports the government sent her to the hospital because there is a war in the government and "now the goal is a lot of games."  Pt reports the government is doing "a lot of mental sanctions on me."  Pt reports that since the games started "I am a tool for the war."  Pt reports she was also sent here by the government "to get therapy because my mind is programmed by the games and some people want to stop the games."  Pt reports she cannot answer a lot of questions "because I could die."  Pt talks about how the government is "suiciding" her.  Pt denies any active SI/I/P.  Pt reports "suiciding" means they are "doing trauma science and they turn it on and off."  Pt reports when it is "on, I am traumatized," and when it is "off, I am calm and confident."  Pt denies AH or VH.  Pt reports the [employer] watches her wherever she goes and they are celebrities.  Pt reports depressed mood with poor motivation, concentration and poor self care.  Pt reports the mental war by the government has been going on for a few weeks and she cannot identify any trigger.  Pt reports she was off her medications and recently restarted them one week ago.  Pt reports the government causes her to have seizures and she starts shaking her body purposefully then starts crying that she cannot answer any more questions.  Pt declines consent to speak with her .

## 2022-03-01 PROCEDURE — 99232 SBSQ HOSP IP/OBS MODERATE 35: CPT

## 2022-03-01 PROCEDURE — 90832 PSYTX W PT 30 MINUTES: CPT

## 2022-03-01 RX ORDER — ARIPIPRAZOLE 15 MG/1
20 TABLET ORAL DAILY
Refills: 0 | Status: DISCONTINUED | OUTPATIENT
Start: 2022-03-02 | End: 2022-03-04

## 2022-03-01 RX ORDER — CLONAZEPAM 1 MG
0.5 TABLET ORAL
Refills: 0 | Status: DISCONTINUED | OUTPATIENT
Start: 2022-03-01 | End: 2022-03-02

## 2022-03-01 RX ADMIN — Medication 75 MILLIGRAM(S): at 08:31

## 2022-03-01 RX ADMIN — HALOPERIDOL DECANOATE 5 MILLIGRAM(S): 100 INJECTION INTRAMUSCULAR at 07:44

## 2022-03-01 RX ADMIN — ARIPIPRAZOLE 15 MILLIGRAM(S): 15 TABLET ORAL at 08:31

## 2022-03-01 NOTE — BH INPATIENT PSYCHIATRY PROGRESS NOTE - NSBHCHARTREVIEWVS_PSY_A_CORE FT
Vital Signs Last 24 Hrs  T(C): 36.7 (02-28-22 @ 17:49), Max: 36.7 (02-28-22 @ 17:49)  T(F): 98 (02-28-22 @ 17:49), Max: 98 (02-28-22 @ 17:49)  HR: --  BP: --  BP(mean): --  RR: --  SpO2: --

## 2022-03-01 NOTE — BH INPATIENT PSYCHIATRY PROGRESS NOTE - NSBHFUPINTERVALHXFT_PSY_A_CORE
Pt compliant with medication and tolerating it well.  Chart reviewed and case discussed with treatment team.  No events reported overnight. Pt denies SI/HI/I/P or AH/VH.  Pt reports eating and sleeping well.  Pt reports she showered today, but feels she needs a 24/7 aide because she cannot care for herself.  Pt reports, "thre is autism science in music."  Pt reports, "whatever symptom they (the government) gives me I call a science."  Pt reports the government will be watching her the rest of her life.  Pt reports they will kill her if she talks about the past and she feels she has had no past since she was born.  Pt reports the government is not speaking to her, but she speaks code and they speak to her through the TV and news.  Pt reports the government is "feds and celebrities."  Pt reports the government is doing games and causing her psychological torture.  Pt reports they make her have seizures and she starts shaking her body.  Pt recommended to try Klonopin for anxiety, but she declines, reports there is breast cancer in the medicine.  Pt reports, "I am COVID, the mask mandates are about me."

## 2022-03-01 NOTE — BH INPATIENT PSYCHIATRY PROGRESS NOTE - CURRENT MEDICATION
MEDICATIONS  (STANDING):  venlafaxine XR 75 milliGRAM(s) Oral daily    MEDICATIONS  (PRN):  diphenhydrAMINE 50 milliGRAM(s) Oral every 6 hours PRN eps ppx/agitation  diphenhydrAMINE Injectable 50 milliGRAM(s) IntraMuscular once PRN eps ppx/severe agitationeps ppx/severe agitation  haloperidol     Tablet 5 milliGRAM(s) Oral every 6 hours PRN agitation  haloperidol    Injectable 5 milliGRAM(s) IntraMuscular once PRN severe agitation  LORazepam     Tablet 2 milliGRAM(s) Oral every 6 hours PRN Agitation  LORazepam   Injectable 2 milliGRAM(s) IntraMuscular Once PRN severe agitation

## 2022-03-01 NOTE — BH INPATIENT PSYCHIATRY PROGRESS NOTE - NSBHMETABOLIC_PSY_ALL_CORE_FT
BMI:   HbA1c: A1C with Estimated Average Glucose Result: 5.0 % (02-27-22 @ 10:45)    Glucose:   BP: 114/63 (02-28-22 @ 07:46) (114/63 - 114/63)  Lipid Panel: Date/Time: 02-27-22 @ 10:45  Cholesterol, Serum: 250  Direct LDL: --  HDL Cholesterol, Serum: 86  Total Cholesterol/HDL Ration Measurement: --  Triglycerides, Serum: 48

## 2022-03-01 NOTE — BH INPATIENT PSYCHIATRY PROGRESS NOTE - NSBHASSESSSUMMFT_PSY_ALL_CORE
Pt disorganized, labile, with delusions and ideas of reference    Plan:  >Legal: 9.39  >Obs: Routine checks appropriate--visible on the unit, no history of aggression on inpatient unit.  No current SI. No need for CO, patient not expected to pose risk to self or others in controlled inpatient setting.  >Psychiatric Meds: outpatient medication regimen: Effexor XR titrated to 75mg PO daily and Abilify titrated to 20mg daily on 3/2  > Labs: Admission labs were reviewed, unremarkable. Follow up with Medical team as needed.   >Medical Comorbidities: No acute concerns. No consultations needed at this time. Patient with consistently stable VS,. Admission labs reviewed, no acute findings.   >Diet: Regular  >Social: milieu therapy  >Treatment Interventions: Groups and Individual Therapy/CBT, Motivational counseling for substance abuse related issues.   >Dispo: Collateral and dispo planning pending further symptom and medication optimization

## 2022-03-02 PROCEDURE — 90853 GROUP PSYCHOTHERAPY: CPT

## 2022-03-02 PROCEDURE — 99232 SBSQ HOSP IP/OBS MODERATE 35: CPT

## 2022-03-02 RX ORDER — CLONAZEPAM 1 MG
1 TABLET ORAL
Refills: 0 | Status: DISCONTINUED | OUTPATIENT
Start: 2022-03-02 | End: 2022-03-09

## 2022-03-02 RX ORDER — VENLAFAXINE HCL 75 MG
112.5 CAPSULE, EXT RELEASE 24 HR ORAL DAILY
Refills: 0 | Status: DISCONTINUED | OUTPATIENT
Start: 2022-03-03 | End: 2022-03-04

## 2022-03-02 RX ADMIN — Medication 0.5 MILLIGRAM(S): at 08:52

## 2022-03-02 RX ADMIN — ARIPIPRAZOLE 20 MILLIGRAM(S): 15 TABLET ORAL at 08:52

## 2022-03-02 RX ADMIN — Medication 75 MILLIGRAM(S): at 08:52

## 2022-03-02 NOTE — BH INPATIENT PSYCHIATRY PROGRESS NOTE - CURRENT MEDICATION
MEDICATIONS  (STANDING):  ARIPiprazole 20 milliGRAM(s) Oral daily    MEDICATIONS  (PRN):  clonazePAM  Tablet 0.5 milliGRAM(s) Oral two times a day PRN anxiety  diphenhydrAMINE 50 milliGRAM(s) Oral every 6 hours PRN eps ppx/agitation  diphenhydrAMINE Injectable 50 milliGRAM(s) IntraMuscular once PRN eps ppx/severe agitationeps ppx/severe agitation  haloperidol     Tablet 5 milliGRAM(s) Oral every 6 hours PRN agitation  haloperidol    Injectable 5 milliGRAM(s) IntraMuscular once PRN severe agitation  LORazepam   Injectable 2 milliGRAM(s) IntraMuscular once PRN agitation

## 2022-03-02 NOTE — BH PSYCHOLOGY - GROUP THERAPY NOTE - NSPSYCHOLGRPSUPGOAL_PSY_A_CORE FT
Decrease symptoms, Develop coping/emotion regulation skills, Increase value-based action, Psychoeducation

## 2022-03-02 NOTE — BH INPATIENT PSYCHIATRY PROGRESS NOTE - NSBHMETABOLIC_PSY_ALL_CORE_FT
BMI:   HbA1c: A1C with Estimated Average Glucose Result: 5.0 % (02-27-22 @ 10:45)    Glucose:   BP: 110/76 (03-02-22 @ 06:33) (110/76 - 114/63)  Lipid Panel: Date/Time: 02-27-22 @ 10:45  Cholesterol, Serum: 250  Direct LDL: --  HDL Cholesterol, Serum: 86  Total Cholesterol/HDL Ration Measurement: --  Triglycerides, Serum: 48

## 2022-03-02 NOTE — BH INPATIENT PSYCHIATRY PROGRESS NOTE - NSBHASSESSSUMMFT_PSY_ALL_CORE
Pt disorganized, labile, with delusions and ideas of reference    Plan:  >Legal: 9.39  >Obs: Routine checks appropriate--visible on the unit, no history of aggression on inpatient unit.  No current SI. No need for CO, patient not expected to pose risk to self or others in controlled inpatient setting.  >Psychiatric Meds: outpatient medication regimen: Effexor XR titrated to 112.5mg PO daily on 3/3 and Abilify titrated to 20mg daily on 3/2  > Labs: Admission labs were reviewed, unremarkable. Follow up with Medical team as needed.   >Medical Comorbidities: No acute concerns. No consultations needed at this time. Patient with consistently stable VS,. Admission labs reviewed, no acute findings.   >Diet: Regular  >Social: milieu therapy  >Treatment Interventions: Groups and Individual Therapy/CBT, Motivational counseling for substance abuse related issues.   >Dispo: Collateral and dispo planning pending further symptom and medication optimization

## 2022-03-02 NOTE — BH INPATIENT PSYCHIATRY PROGRESS NOTE - NSBHCHARTREVIEWVS_PSY_A_CORE FT
Vital Signs Last 24 Hrs  T(C): 37.1 (03-02-22 @ 06:33), Max: 37.1 (03-02-22 @ 06:33)  T(F): 98.7 (03-02-22 @ 06:33), Max: 98.7 (03-02-22 @ 06:33)  HR: 76 (03-02-22 @ 06:33) (76 - 76)  BP: 110/76 (03-02-22 @ 06:33) (110/76 - 110/76)  BP(mean): --  RR: --  SpO2: --

## 2022-03-02 NOTE — BH PSYCHOLOGY - GROUP THERAPY NOTE - NSPSYCHOLGRPSUPPT_PSY_A_CORE FT
Patient attended recovery oriented/acceptance & commitment therapy group. Group began with check in prompt (I feel most alive when I am...). This prompt spurred supportive and reflective discussion about pts' responses. Members shared a collection of activities centered on nature, nurturing others, spending time with close friends, and engaging in personal interests. Reflected on similarities across responses - a clear focus on present moment activities (vs reminiscing, ruminating). Group focused on facilitation of social interactions between members in an effort to support development of social skills. Members were observed engaging in genuine discussion with one another. Facilitator elicited pts' reactions to the group - members found the open-ended style of group helpful and also noted that distractors in the environment (outside noise) brought on some challenges. Acknowledged difficulty with outside stimuli and related this to distractors in real world environments and ways of coping.  facilitated discussion of concepts, encouraged active participation, and supported members providing feedback to peers. Session concluded with exercise to develop empathy - pts wrote a small message of encouragement/support to be distributed to other pts on the unit.

## 2022-03-02 NOTE — BH PSYCHOLOGY - GROUP THERAPY NOTE - NSPSYCHOLGRPSUPGOAL_PSY_A_CORE
improve social skills via group participaton/increase sense of belonging to treatment milieu/other...

## 2022-03-02 NOTE — BH INPATIENT PSYCHIATRY PROGRESS NOTE - NSBHMSESPABN_PSY_A_CORE
Increased productivity
Loud volume/Increased productivity
Increased productivity
Loud volume/Increased productivity

## 2022-03-03 PROCEDURE — 99232 SBSQ HOSP IP/OBS MODERATE 35: CPT

## 2022-03-03 PROCEDURE — 90853 GROUP PSYCHOTHERAPY: CPT

## 2022-03-03 RX ADMIN — ARIPIPRAZOLE 20 MILLIGRAM(S): 15 TABLET ORAL at 08:43

## 2022-03-03 RX ADMIN — Medication 112.5 MILLIGRAM(S): at 08:42

## 2022-03-03 NOTE — BH INPATIENT PSYCHIATRY PROGRESS NOTE - NSBHCHARTREVIEWVS_PSY_A_CORE FT
Vital Signs Last 24 Hrs  T(C): 36.7 (03-03-22 @ 08:25), Max: 36.7 (03-03-22 @ 08:25)  T(F): 98.1 (03-03-22 @ 08:25), Max: 98.1 (03-03-22 @ 08:25)  HR: 98 (03-03-22 @ 08:25) (98 - 98)  BP: 102/62 (03-03-22 @ 08:25) (102/62 - 102/62)  BP(mean): --  RR: --  SpO2: --

## 2022-03-03 NOTE — BH INPATIENT PSYCHIATRY PROGRESS NOTE - NSBHASSESSSUMMFT_PSY_ALL_CORE
Pt disorganized, labile, with delusions and ideas of reference    Plan:  >Legal: 9.39  >Obs: Routine checks appropriate--visible on the unit, no history of aggression on inpatient unit.  No current SI. No need for CO, patient not expected to pose risk to self or others in controlled inpatient setting.  >Psychiatric Meds: outpatient medication regimen: Effexor XR titrated to 112.5mg PO daily on 3/3 and Abilify titrated to 20mg daily on 3/2  > Labs: Admission labs were reviewed, unremarkable. Follow up with Medical team as needed.   >Medical Comorbidities: No acute concerns. No consultations needed at this time. Patient with consistently stable VS,. Admission labs reviewed, no acute findings.   >Diet: Regular  >Social: milieu therapy  >Treatment Interventions: Groups and Individual Therapy/CBT, Motivational counseling for substance abuse related issues.   >Dispo: Collateral and dispo planning pending further symptom and medication optimization     Pt reports her mood is improving, continues to have delusions and ideas of reference    Plan:  >Legal: 9.39  >Obs: Routine checks appropriate--visible on the unit, no history of aggression on inpatient unit.  No current SI. No need for CO, patient not expected to pose risk to self or others in controlled inpatient setting.  >Psychiatric Meds: outpatient medication regimen: Effexor XR titrated to 112.5mg PO daily on 3/3 and Abilify titrated to 20mg daily on 3/2  > Labs: Admission labs were reviewed, unremarkable. Follow up with Medical team as needed.   >Medical Comorbidities: No acute concerns. No consultations needed at this time. Patient with consistently stable VS,. Admission labs reviewed, no acute findings.   >Diet: Regular  >Social: milieu therapy  >Treatment Interventions: Groups and Individual Therapy/CBT, Motivational counseling for substance abuse related issues.   >Dispo: Collateral and dispo planning pending further symptom and medication optimization

## 2022-03-03 NOTE — BH INPATIENT PSYCHIATRY PROGRESS NOTE - CURRENT MEDICATION
MEDICATIONS  (STANDING):  ARIPiprazole 20 milliGRAM(s) Oral daily  venlafaxine .5 milliGRAM(s) Oral daily    MEDICATIONS  (PRN):  clonazePAM  Tablet 1 milliGRAM(s) Oral two times a day PRN anxiety  diphenhydrAMINE 50 milliGRAM(s) Oral every 6 hours PRN eps ppx/agitation  diphenhydrAMINE Injectable 50 milliGRAM(s) IntraMuscular once PRN eps ppx/severe agitationeps ppx/severe agitation  haloperidol     Tablet 5 milliGRAM(s) Oral every 6 hours PRN agitation  haloperidol    Injectable 5 milliGRAM(s) IntraMuscular once PRN severe agitation  LORazepam   Injectable 2 milliGRAM(s) IntraMuscular once PRN agitation

## 2022-03-04 PROCEDURE — 90853 GROUP PSYCHOTHERAPY: CPT

## 2022-03-04 PROCEDURE — 99232 SBSQ HOSP IP/OBS MODERATE 35: CPT

## 2022-03-04 PROCEDURE — 90832 PSYTX W PT 30 MINUTES: CPT | Mod: 59

## 2022-03-04 RX ORDER — VENLAFAXINE HCL 75 MG
150 CAPSULE, EXT RELEASE 24 HR ORAL DAILY
Refills: 0 | Status: COMPLETED | OUTPATIENT
Start: 2022-03-05 | End: 2022-03-06

## 2022-03-04 RX ORDER — ARIPIPRAZOLE 15 MG/1
30 TABLET ORAL DAILY
Refills: 0 | Status: DISCONTINUED | OUTPATIENT
Start: 2022-03-05 | End: 2022-03-08

## 2022-03-04 RX ADMIN — ARIPIPRAZOLE 20 MILLIGRAM(S): 15 TABLET ORAL at 08:52

## 2022-03-04 RX ADMIN — Medication 112.5 MILLIGRAM(S): at 08:52

## 2022-03-04 NOTE — BH INPATIENT PSYCHIATRY PROGRESS NOTE - CURRENT MEDICATION
MEDICATIONS  (STANDING):  ARIPiprazole 20 milliGRAM(s) Oral daily    MEDICATIONS  (PRN):  clonazePAM  Tablet 1 milliGRAM(s) Oral two times a day PRN anxiety  diphenhydrAMINE 50 milliGRAM(s) Oral every 6 hours PRN eps ppx/agitation  diphenhydrAMINE Injectable 50 milliGRAM(s) IntraMuscular once PRN eps ppx/severe agitationeps ppx/severe agitation  haloperidol     Tablet 5 milliGRAM(s) Oral every 6 hours PRN agitation  haloperidol    Injectable 5 milliGRAM(s) IntraMuscular once PRN severe agitation  LORazepam   Injectable 2 milliGRAM(s) IntraMuscular once PRN agitation   MEDICATIONS  (STANDING):    MEDICATIONS  (PRN):  clonazePAM  Tablet 1 milliGRAM(s) Oral two times a day PRN anxiety  diphenhydrAMINE 50 milliGRAM(s) Oral every 6 hours PRN eps ppx/agitation  diphenhydrAMINE Injectable 50 milliGRAM(s) IntraMuscular once PRN eps ppx/severe agitationeps ppx/severe agitation  haloperidol     Tablet 5 milliGRAM(s) Oral every 6 hours PRN agitation  haloperidol    Injectable 5 milliGRAM(s) IntraMuscular once PRN severe agitation  LORazepam   Injectable 2 milliGRAM(s) IntraMuscular once PRN agitation   MEDICATIONS  (STANDING):  ARIPiprazole 30 milliGRAM(s) Oral daily  venlafaxine  milliGRAM(s) Oral daily    MEDICATIONS  (PRN):  clonazePAM  Tablet 1 milliGRAM(s) Oral two times a day PRN anxiety  diphenhydrAMINE 50 milliGRAM(s) Oral every 6 hours PRN eps ppx/agitation  diphenhydrAMINE Injectable 50 milliGRAM(s) IntraMuscular once PRN eps ppx/severe agitationeps ppx/severe agitation  haloperidol     Tablet 5 milliGRAM(s) Oral every 6 hours PRN agitation  haloperidol    Injectable 5 milliGRAM(s) IntraMuscular once PRN severe agitation  LORazepam   Injectable 2 milliGRAM(s) IntraMuscular once PRN agitation

## 2022-03-04 NOTE — BH INPATIENT PSYCHIATRY PROGRESS NOTE - NSBHFUPINTERVALHXFT_PSY_A_CORE
Pt compliant with medication and tolerating it well.  Chart reviewed and case discussed with treatment team.  No events reported overnight.  Pt appears more depressed and anxious today.  Pt reports after she talked to the treatment team yesterday, the government "suicided me."  Pt reports the phones are tapped and she cannot talk on them due to this.  Pt reports a bunch of people who have sanctions are doing war.  Pt reports she saw on the news that there is a hit on her.  Pt reports the government is mad that writer is documenting.  Pt reports, "I am Long COVID."  Pt reports they are doing games in here and if she is not released from the hospital, people in the government will die.  Pt reports she can read in the news what games they are doing.  Pt reports celebrities are in war after her.  Pt declining to take Klonopin PRN, feels there is breast cancer in it and that the government tells her not to take it.  Pt reports, "they call me Talha Fitzpatrick because I am a tool for war." Pt compliant with medication and tolerating it well.  Chart reviewed and case discussed with treatment team.  No events reported overnight.  Pt appears more depressed and anxious today.  Pt reports after she talked to the treatment team yesterday, the government "suicided me."  Pt reports the phones are tapped and she cannot talk on them due to this.  Pt reports a bunch of people who have sanctions are doing war.  Pt reports she saw on the news that there is a hit on her.  Pt reports the government is mad that writer is documenting.  Pt reports, "I am Long COVID."  Pt reports they are doing games in here and if she is not released from the hospital, people in the government will die.  Pt reports she can read in the news what games they are doing.  Pt reports celebrities are in war after her.  Pt declining to take Klonopin PRN or switching medication at this time, feels there is breast cancer in it and that the government tells her not to take it.  Pt reports, "they call me Talha Fitzpatrick because I am a tool for war." Pt compliant with medication and tolerating it well.  Chart reviewed and case discussed with treatment team.  No events reported overnight.  Pt appears more depressed and anxious today.  Pt reports after she talked to the treatment team yesterday, the government "suicided me."  Pt reports the phones are tapped and she cannot talk on them due to this.  Pt reports a bunch of people who have sanctions are doing war.  Pt reports she saw on the news that there is a hit on her.  Pt reports the government is mad that writer is documenting.  Pt reports, "I am Long COVID."  Pt reports they are doing games in here and if she is not released from the hospital, people in the government will die.  Pt reports she can read in the news what games they are doing.  Pt reports celebrities are in war after her.  Pt declining to take Klonopin PRN or switching medication at this time, feels there is breast cancer in it and that the government tells her not to take it.  Pt reports she will not take other medications even if the government sends her a message to take it.  Pt reports, "they call me Talha Fitzpatrick because I am a tool for war."

## 2022-03-04 NOTE — BH INPATIENT PSYCHIATRY PROGRESS NOTE - NSBHASSESSSUMMFT_PSY_ALL_CORE
Pt reports her mood is improving, continues to have delusions and ideas of reference    Plan:  >Legal: 9.39  >Obs: Routine checks appropriate--visible on the unit, no history of aggression on inpatient unit.  No current SI. No need for CO, patient not expected to pose risk to self or others in controlled inpatient setting.  >Psychiatric Meds: outpatient medication regimen: Effexor XR titrated to 150mg PO daily for 3/5 x 2 days then titrated to 187.5mg PO daily on 3/7, and Abilify titrated to 20mg daily   > Labs: Admission labs were reviewed, unremarkable. Follow up with Medical team as needed.   >Medical Comorbidities: No acute concerns. No consultations needed at this time. Patient with consistently stable VS,. Admission labs reviewed, no acute findings.   >Diet: Regular  >Social: milieu therapy  >Treatment Interventions: Groups and Individual Therapy/CBT, Motivational counseling for substance abuse related issues.   >Dispo: Collateral and dispo planning pending further symptom and medication optimization     Pt more depressed and anxious today, continues to have delusions and ideas of reference.  Declining Klonopin PO PRN or switching antipsychotics at this time, reports other medication has breast cancer    Plan:  >Legal: 9.39  >Obs: Routine checks appropriate--visible on the unit, no history of aggression on inpatient unit.  No current SI. No need for CO, patient not expected to pose risk to self or others in controlled inpatient setting.  >Psychiatric Meds: outpatient medication regimen: Effexor XR titrated to 150mg PO daily for 3/5 x 2 days then titrated to 187.5mg PO daily on 3/7, and Abilify titrated to 20mg daily, consider switching antipsychotic medication if patient agrees  > Labs: Admission labs were reviewed, unremarkable. Follow up with Medical team as needed.   >Medical Comorbidities: No acute concerns. No consultations needed at this time. Patient with consistently stable VS,. Admission labs reviewed, no acute findings.   >Diet: Regular  >Social: milieu therapy  >Treatment Interventions: Groups and Individual Therapy/CBT, Motivational counseling for substance abuse related issues.   >Dispo: Collateral and dispo planning pending further symptom and medication optimization     Pt more depressed and anxious today, continues to have delusions and ideas of reference.  Declining Klonopin PO PRN or switching antipsychotics at this time, reports other medication has breast cancer and she will not take them even if the government sends her a message to do so    Plan:  >Legal: 9.39  >Obs: Routine checks appropriate--visible on the unit, no history of aggression on inpatient unit.  No current SI. No need for CO, patient not expected to pose risk to self or others in controlled inpatient setting.  >Psychiatric Meds: outpatient medication regimen: Effexor XR titrated to 150mg PO daily for 3/5 x 2 days then titrated to 187.5mg PO daily on 3/7, and Abilify titrated to 30mg daily on 3/5, consider switching antipsychotic medication if patient agrees  > Labs: Admission labs were reviewed, unremarkable. Follow up with Medical team as needed.   >Medical Comorbidities: No acute concerns. No consultations needed at this time. Patient with consistently stable VS,. Admission labs reviewed, no acute findings.   >Diet: Regular  >Social: milieu therapy  >Treatment Interventions: Groups and Individual Therapy/CBT, Motivational counseling for substance abuse related issues.   >Dispo: Collateral and dispo planning pending further symptom and medication optimization     Pt more depressed and anxious today, continues to have delusions and ideas of reference.  Declining Klonopin PO PRN or switching antipsychotics at this time, reports other medication has breast cancer and she will not take them even if the government sends her a message to do so    Plan:  >Legal: 9.39  >Obs: Routine checks appropriate--visible on the unit, no history of aggression on inpatient unit.  No current SI. No need for CO, patient not expected to pose risk to self or others in controlled inpatient setting.  >Psychiatric Meds: outpatient medication regimen: Effexor XR titrated to 150mg PO daily for 3/5, and Abilify titrated to 30mg daily on 3/5, consider switching antipsychotic medication if patient agrees.  Discussed case with unit chief Dr. Clement who was in agreement with plan.  In agreement with monitoring her through mid next week on Abilify and consider switching to Invega if patient agrees  > Labs: Admission labs were reviewed, unremarkable. Follow up with Medical team as needed.   >Medical Comorbidities: No acute concerns. No consultations needed at this time. Patient with consistently stable VS,. Admission labs reviewed, no acute findings.   >Diet: Regular  >Social: milieu therapy  >Treatment Interventions: Groups and Individual Therapy/CBT, Motivational counseling for substance abuse related issues.   >Dispo: Collateral and dispo planning pending further symptom and medication optimization     Pt more depressed and anxious today, continues to have delusions and ideas of reference.  Declining Klonopin PO PRN or switching antipsychotics at this time, reports other medication has breast cancer and she will not take them even if the government sends her a message to do so    Plan:  >Legal: 9.39  >Obs: Routine checks appropriate--visible on the unit, no history of aggression on inpatient unit.  No current SI. No need for CO, patient not expected to pose risk to self or others in controlled inpatient setting.  >Psychiatric Meds: outpatient medication regimen: Effexor XR titrated to 150mg PO daily for 3/5, and Abilify titrated to 30mg daily on 3/5 as patient declining switching antipsychotic medication at this time.  Consider switching antipsychotic medication if patient agrees.  Discussed case with unit chief Dr. Clement who was in agreement with plan.  In agreement with monitoring her through mid next week on Abilify 30mg PO daily and consider switching to Invega if patient agrees  > Labs: Admission labs were reviewed, unremarkable. Follow up with Medical team as needed.   >Medical Comorbidities: No acute concerns. No consultations needed at this time. Patient with consistently stable VS,. Admission labs reviewed, no acute findings.   >Diet: Regular  >Social: milieu therapy  >Treatment Interventions: Groups and Individual Therapy/CBT, Motivational counseling for substance abuse related issues.   >Dispo: Collateral and dispo planning pending further symptom and medication optimization

## 2022-03-04 NOTE — BH INPATIENT PSYCHIATRY PROGRESS NOTE - NSBHMETABOLIC_PSY_ALL_CORE_FT
BMI:   HbA1c: A1C with Estimated Average Glucose Result: 5.0 % (02-27-22 @ 10:45)    Glucose:   BP: 102/62 (03-03-22 @ 08:25) (102/62 - 110/76)  Lipid Panel: Date/Time: 02-27-22 @ 10:45  Cholesterol, Serum: 250  Direct LDL: --  HDL Cholesterol, Serum: 86  Total Cholesterol/HDL Ration Measurement: --  Triglycerides, Serum: 48   BMI:   HbA1c: A1C with Estimated Average Glucose Result: 5.0 % (02-27-22 @ 10:45)    Glucose:   BP: 119/73 (03-07-22 @ 07:33) (119/73 - 125/74)  Lipid Panel: Date/Time: 02-27-22 @ 10:45  Cholesterol, Serum: 250  Direct LDL: --  HDL Cholesterol, Serum: 86  Total Cholesterol/HDL Ration Measurement: --  Triglycerides, Serum: 48

## 2022-03-04 NOTE — BH INPATIENT PSYCHIATRY PROGRESS NOTE - NSBHCHARTREVIEWVS_PSY_A_CORE FT
Vital Signs Last 24 Hrs  T(C): 36.9 (03-04-22 @ 08:22), Max: 36.9 (03-04-22 @ 08:22)  T(F): 98.5 (03-04-22 @ 08:22), Max: 98.5 (03-04-22 @ 08:22)  HR: --  BP: --  BP(mean): --  RR: --  SpO2: --    Orthostatic VS  03-04-22 @ 08:22  Lying BP: --/-- HR: --  Sitting BP: 118/66 HR: 67  Standing BP: 105/59 HR: 83  Site: --  Mode: --   Vital Signs Last 24 Hrs  T(C): 36.4 (03-07-22 @ 07:33), Max: 37 (03-06-22 @ 18:20)  T(F): 97.5 (03-07-22 @ 07:33), Max: 98.6 (03-06-22 @ 18:20)  HR: 80 (03-07-22 @ 07:33) (80 - 80)  BP: 119/73 (03-07-22 @ 07:33) (119/73 - 119/73)  BP(mean): --  RR: --  SpO2: --

## 2022-03-05 RX ADMIN — Medication 50 MILLIGRAM(S): at 13:18

## 2022-03-05 RX ADMIN — HALOPERIDOL DECANOATE 5 MILLIGRAM(S): 100 INJECTION INTRAMUSCULAR at 13:18

## 2022-03-05 RX ADMIN — ARIPIPRAZOLE 30 MILLIGRAM(S): 15 TABLET ORAL at 07:44

## 2022-03-05 RX ADMIN — Medication 150 MILLIGRAM(S): at 07:44

## 2022-03-06 RX ORDER — VENLAFAXINE HCL 75 MG
150 CAPSULE, EXT RELEASE 24 HR ORAL DAILY
Refills: 0 | Status: DISCONTINUED | OUTPATIENT
Start: 2022-03-07 | End: 2022-03-15

## 2022-03-06 RX ORDER — BENZTROPINE MESYLATE 1 MG
1 TABLET ORAL ONCE
Refills: 0 | Status: DISCONTINUED | OUTPATIENT
Start: 2022-03-06 | End: 2022-03-07

## 2022-03-06 RX ORDER — BENZTROPINE MESYLATE 1 MG
1 TABLET ORAL
Refills: 0 | Status: DISCONTINUED | OUTPATIENT
Start: 2022-03-06 | End: 2022-03-07

## 2022-03-06 RX ADMIN — Medication 50 MILLIGRAM(S): at 17:39

## 2022-03-06 RX ADMIN — Medication 150 MILLIGRAM(S): at 08:41

## 2022-03-06 RX ADMIN — ARIPIPRAZOLE 30 MILLIGRAM(S): 15 TABLET ORAL at 08:41

## 2022-03-07 PROCEDURE — 99232 SBSQ HOSP IP/OBS MODERATE 35: CPT

## 2022-03-07 PROCEDURE — 90853 GROUP PSYCHOTHERAPY: CPT

## 2022-03-07 RX ADMIN — HALOPERIDOL DECANOATE 5 MILLIGRAM(S): 100 INJECTION INTRAMUSCULAR at 17:14

## 2022-03-07 RX ADMIN — Medication 150 MILLIGRAM(S): at 08:47

## 2022-03-07 RX ADMIN — ARIPIPRAZOLE 30 MILLIGRAM(S): 15 TABLET ORAL at 08:47

## 2022-03-07 NOTE — BH INPATIENT PSYCHIATRY PROGRESS NOTE - NSBHASSESSSUMMFT_PSY_ALL_CORE
Pt calmer and does not appear distressed or anxious.  Pt reports the government has stopped and they are no longer playing games    Plan:  >Legal: 9.39  >Obs: Routine checks appropriate--visible on the unit, no history of aggression on inpatient unit.  No current SI. No need for CO, patient not expected to pose risk to self or others in controlled inpatient setting.  >Psychiatric Meds: outpatient medication regimen: Effexor XR titrated to 150mg PO daily and Abilify 30mg PO daily  > Labs: Admission labs were reviewed, unremarkable. Follow up with Medical team as needed.   >Medical Comorbidities: No acute concerns. No consultations needed at this time. Patient with consistently stable VS,. Admission labs reviewed, no acute findings.   >Diet: Regular  >Social: milieu therapy  >Treatment Interventions: Groups and Individual Therapy/CBT, Motivational counseling for substance abuse related issues.   >Dispo: Collateral and dispo planning pending further symptom and medication optimization     Pt calmer and does not appear distressed or anxious.  Pt reports the government has stopped and they are no longer playing games    Plan:  >Legal: 9.39  >Obs: Routine checks appropriate--visible on the unit, no history of aggression on inpatient unit.  No current SI. No need for CO, patient not expected to pose risk to self or others in controlled inpatient setting.  >Psychiatric Meds: outpatient medication regimen: Effexor XR titrated to 150mg PO daily and Abilify 30mg PO daily.  Discussed case with Dr. Clement who is in agreement with monitoring patient progress a few more days on Abilify and will consider switching to Invega is patient's sx worsen  > Labs: Admission labs were reviewed, unremarkable. Follow up with Medical team as needed.   >Medical Comorbidities: No acute concerns. No consultations needed at this time. Patient with consistently stable VS,. Admission labs reviewed, no acute findings.   >Diet: Regular  >Social: milieu therapy  >Treatment Interventions: Groups and Individual Therapy/CBT, Motivational counseling for substance abuse related issues.   >Dispo: Collateral and dispo planning pending further symptom and medication optimization

## 2022-03-07 NOTE — BH INPATIENT PSYCHIATRY PROGRESS NOTE - NSBHCHARTREVIEWVS_PSY_A_CORE FT
Vital Signs Last 24 Hrs  T(C): 36.4 (03-07-22 @ 07:33), Max: 37 (03-06-22 @ 18:20)  T(F): 97.5 (03-07-22 @ 07:33), Max: 98.6 (03-06-22 @ 18:20)  HR: 80 (03-07-22 @ 07:33) (80 - 80)  BP: 119/73 (03-07-22 @ 07:33) (119/73 - 119/73)  BP(mean): --  RR: --  SpO2: --

## 2022-03-07 NOTE — BH INPATIENT PSYCHIATRY PROGRESS NOTE - NSBHMETABOLIC_PSY_ALL_CORE_FT
BMI:   HbA1c: A1C with Estimated Average Glucose Result: 5.0 % (02-27-22 @ 10:45)    Glucose:   BP: 119/73 (03-07-22 @ 07:33) (119/73 - 125/74)  Lipid Panel: Date/Time: 02-27-22 @ 10:45  Cholesterol, Serum: 250  Direct LDL: --  HDL Cholesterol, Serum: 86  Total Cholesterol/HDL Ration Measurement: --  Triglycerides, Serum: 48

## 2022-03-07 NOTE — BH INPATIENT PSYCHIATRY PROGRESS NOTE - CURRENT MEDICATION
MEDICATIONS  (STANDING):  ARIPiprazole 30 milliGRAM(s) Oral daily  venlafaxine  milliGRAM(s) Oral daily    MEDICATIONS  (PRN):  clonazePAM  Tablet 1 milliGRAM(s) Oral two times a day PRN anxiety  diphenhydrAMINE 50 milliGRAM(s) Oral every 6 hours PRN eps ppx/agitation  diphenhydrAMINE Injectable 50 milliGRAM(s) IntraMuscular once PRN eps ppx/severe agitationeps ppx/severe agitation  haloperidol     Tablet 5 milliGRAM(s) Oral every 6 hours PRN agitation  haloperidol    Injectable 5 milliGRAM(s) IntraMuscular once PRN severe agitation  LORazepam   Injectable 2 milliGRAM(s) IntraMuscular once PRN agitation

## 2022-03-07 NOTE — BH INPATIENT PSYCHIATRY PROGRESS NOTE - NSICDXBHSECONDARYDX_PSY_ALL_CORE
PTSD (post-traumatic stress disorder)   F43.10  

## 2022-03-07 NOTE — BH INPATIENT PSYCHIATRY PROGRESS NOTE - NSBHFUPINTERVALHXFT_PSY_A_CORE
Pt compliant with medication, declining Cogentin.  Chart reviewed and case discussed with treatment team.  No events reported overnight.  Pt smiling more today, does not appear distressed or anxious.  Pt reports the government stopped and when she is discharged there are going to be no more games.  Pt reports she is no longer getting codes from the news.  Pt without SI/HI/I/P or AH/VH.  Pt reports eating and sleeping well.  Pt reports she feels calm and relaxed and not depressed.  Pt reports she feels back to her normal self and wants to live a normal life.  Pt reports she has energy and does not feel she needs a titration of Effexor XR.  Pt reports she would like to be discharged soon.  Pt reports her  visited yesterday and the visit went well and he said to her that she appears calmer.  Pt would like to get back to [facility] in [place] where she lives. Pt compliant with medication, declining Cogentin.  Chart reviewed and case discussed with treatment team.  No events reported overnight.  Pt smiling more today, does not appear distressed or anxious.  Pt reports the government stopped and when she is discharged there are going to be no more games.  Pt reports she is no longer getting codes from the news.  Pt without SI/HI/I/P or AH/VH.  Pt reports eating and sleeping well.  Pt reports she feels calm and relaxed and not depressed.  Pt reports she feels back to her normal self and wants to live a normal life.  Pt reports she has energy and does not feel she needs a titration of Effexor XR.  Pt reports she would like to be discharged soon, reports she will deteriorate if not discharged.  Pt reports her  visited yesterday and the visit went well and he said to her that she appears calmer.  Pt would like to get back to [facility] in [place] where she lives.

## 2022-03-08 PROCEDURE — 90853 GROUP PSYCHOTHERAPY: CPT

## 2022-03-08 PROCEDURE — 90832 PSYTX W PT 30 MINUTES: CPT | Mod: 59

## 2022-03-08 PROCEDURE — 99232 SBSQ HOSP IP/OBS MODERATE 35: CPT

## 2022-03-08 RX ORDER — ARIPIPRAZOLE 15 MG/1
20 TABLET ORAL DAILY
Refills: 0 | Status: DISCONTINUED | OUTPATIENT
Start: 2022-03-09 | End: 2022-03-10

## 2022-03-08 RX ORDER — PALIPERIDONE 1.5 MG/1
6 TABLET, EXTENDED RELEASE ORAL AT BEDTIME
Refills: 0 | Status: COMPLETED | OUTPATIENT
Start: 2022-03-08 | End: 2022-03-11

## 2022-03-08 RX ADMIN — Medication 150 MILLIGRAM(S): at 08:39

## 2022-03-08 RX ADMIN — HALOPERIDOL DECANOATE 5 MILLIGRAM(S): 100 INJECTION INTRAMUSCULAR at 06:59

## 2022-03-08 RX ADMIN — PALIPERIDONE 6 MILLIGRAM(S): 1.5 TABLET, EXTENDED RELEASE ORAL at 21:21

## 2022-03-08 RX ADMIN — Medication 50 MILLIGRAM(S): at 06:59

## 2022-03-08 RX ADMIN — ARIPIPRAZOLE 30 MILLIGRAM(S): 15 TABLET ORAL at 08:39

## 2022-03-08 NOTE — BH PSYCHOLOGY - CLINICIAN PSYCHOTHERAPY NOTE - NSBHPSYCHOLINT_PSY_A_CORE FT
Acceptance & commitment therapy, Emotion regulation/coping skills taught, Psychoeducation 

## 2022-03-08 NOTE — BH INPATIENT PSYCHIATRY PROGRESS NOTE - NSBHCHARTREVIEWVS_PSY_A_CORE FT
Vital Signs Last 24 Hrs  T(C): 37.2 (03-08-22 @ 07:46), Max: 37.2 (03-08-22 @ 07:46)  T(F): 98.9 (03-08-22 @ 07:46), Max: 98.9 (03-08-22 @ 07:46)  HR: 78 (03-08-22 @ 07:46) (78 - 78)  BP: 106/66 (03-08-22 @ 07:46) (106/66 - 106/66)  BP(mean): --  RR: --  SpO2: --

## 2022-03-08 NOTE — BH PSYCHOLOGY - CLINICIAN PSYCHOTHERAPY NOTE - NSTXPSYCHOGOAL_PSY_ALL_CORE
Will engage in a 15 minute conversation with no irrational content

## 2022-03-08 NOTE — BH INPATIENT PSYCHIATRY PROGRESS NOTE - NSBHASSESSSUMMFT_PSY_ALL_CORE
Pt more anxious today, delusions about the government and ideas of reference, agrees with plan to start Invega and down taper Abilify with plan to discontinue    Plan:  >Legal: 9.39  >Obs: Routine checks appropriate--visible on the unit, no history of aggression on inpatient unit.  No current SI. No need for CO, patient not expected to pose risk to self or others in controlled inpatient setting.  >Psychiatric Meds: outpatient medication regimen: Effexor XR titrated to 150mg PO daily.  Taper down Abilify to 20mg PO daily on 3/9 and start Invega 6mg PO at bedtime tonight  > Labs: Admission labs were reviewed, unremarkable. Follow up with Medical team as needed.   >Medical Comorbidities: No acute concerns. No consultations needed at this time. Patient with consistently stable VS,. Admission labs reviewed, no acute findings.   >Diet: Regular  >Social: milieu therapy  >Treatment Interventions: Groups and Individual Therapy/CBT, Motivational counseling for substance abuse related issues.   >Dispo: Collateral and dispo planning pending further symptom and medication optimization

## 2022-03-08 NOTE — BH INPATIENT PSYCHIATRY PROGRESS NOTE - NSBHFUPINTERVALHXFT_PSY_A_CORE
Pt compliant with medication and tolerating it well.  Chart reviewed and case discussed with treatment team.  Per Dr. Clement, patient yesterday asked to speak with him and appeared paranoid, talking about government sanctions.  Pt on interview reports she was sanctioned by the government this morning then it went away.  Pt reports she was "suicided" by the government yesterday afternoon then it went away.  Pt reports they do "suicide science" then it stopped immediately.  Pt reports the government gave her the science that stops it and she read about it in the news that the government stops it.  Pt reports the sanctions from the government will probably never stop.  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  Pt feels her life is being risked while in the hospital because "they just closed by AIDS."  Pt reports they are doing games in the hospital and the people doing groups are doing games.  Discussed with patient risks and benefits of starting Invega and down tapering Abilify with plan to discontinue and she is in agreement with plan, but focused on discharge.    Writer and SW called patient's , Hill Hooper ([phone]), after patient interview.  Discussed patients ongoing sx and plan to start Invega and down taper Abilify with plan to discontinue.  He reports patient seemed calmer when he visited Sunday and that stress of being hospitalized is contributing to her sx.  Writer provided psychoeducation and support and he is in agreement with plan for medication change.

## 2022-03-08 NOTE — BH INPATIENT PSYCHIATRY PROGRESS NOTE - CURRENT MEDICATION
MEDICATIONS  (STANDING):  paliperidone ER 6 milliGRAM(s) Oral at bedtime  venlafaxine  milliGRAM(s) Oral daily    MEDICATIONS  (PRN):  clonazePAM  Tablet 1 milliGRAM(s) Oral two times a day PRN anxiety  diphenhydrAMINE 50 milliGRAM(s) Oral every 6 hours PRN eps ppx/agitation  diphenhydrAMINE Injectable 50 milliGRAM(s) IntraMuscular once PRN eps ppx/severe agitationeps ppx/severe agitation  haloperidol     Tablet 5 milliGRAM(s) Oral every 6 hours PRN agitation  haloperidol    Injectable 5 milliGRAM(s) IntraMuscular once PRN severe agitation  LORazepam   Injectable 2 milliGRAM(s) IntraMuscular once PRN agitation

## 2022-03-08 NOTE — BH INPATIENT PSYCHIATRY PROGRESS NOTE - NSBHMETABOLIC_PSY_ALL_CORE_FT
BMI:   HbA1c: A1C with Estimated Average Glucose Result: 5.0 % (02-27-22 @ 10:45)    Glucose:   BP: 106/66 (03-08-22 @ 07:46) (106/66 - 121/74)  Lipid Panel: Date/Time: 02-27-22 @ 10:45  Cholesterol, Serum: 250  Direct LDL: --  HDL Cholesterol, Serum: 86  Total Cholesterol/HDL Ration Measurement: --  Triglycerides, Serum: 48

## 2022-03-08 NOTE — BH PSYCHOLOGY - CLINICIAN PSYCHOTHERAPY NOTE - NSBHPSYCHOLNARRATIVE_PSY_A_CORE FT
Engaged in psychotherapy session. Checked in with pt and she reported that the sanctions imposed on her have been cancelled - she learned this information about she watched the news (writer had difficulty following pt's story). She believes that there are hidden cameras everywhere; writer reflected affect of how it might feel to be watched at all times (suffocating, restricting). At various points in today's session, pt requested to end the meeting because speaking about herself could upset the government despite her belief that the sanctions have been cancelled. Writer commended pt's contributions in group today - she was able to speak because the sanctions allowed her to do so. When writer asked pt various questions about her interests and plans for the future, she reported she could not answer because "they are watching" and regarded the situation as life/death. Of note, pt did not discuss COVID and being "suicided."  Writer encouraged pt to consider what her life would look like without sanctions ("I would be happy").     Patient participated in psychotherapy session. Patient presented with adequate grooming and was casually dressed. Patient maintained appropriate eye contact and demonstrated a cooperative attitude. No abnormal motor movements noted. Patient's mood was anxious with reaction, congruent affect. Speech was within normal limits. No perceptual disturbances noted or observed. Patient's thought process was better organized but perseverative on sanctions. Patient's thought content was significant for beliefs about government sanctions. No active suicidal ideation/intent/plan elicited. No HI endorsed. Insight and judgement are limited. 
Engaged in session with psychologist. Pt appeared gravely concerned with the government monitoring her, keeping her from speaking about her past, and "suiciding" her. She was visibly distressed during session and at times, she began to sob when speaking about her beliefs about the government. Pt made comments about COVID including "false negative," and "false positive." Pt asked writer to contact the government to inform them that pt is a good, dependable person and that she should no longer be targeted ("they will listen to you because you are a psychologist"). Session offered space for pt to express her beliefs safely. Writer informed pt that the team will work to help her feel safe and less suspicious about the government.     Patient participated in psychotherapy session. Patient presented with adequate grooming and was casually dressed. Patient maintained appropriate eye contact and demonstrated a cooperative attitude. No abnormal motor movements noted. Patient's mood was anxious with labile affect. Speech was within normal limits. No perceptual disturbances noted or observed. Patient's thought process was difficult to follow and had illogicality. Patient's thought content was significant for persecutory beliefs about the government. Patient denied active suicidal ideation/intent/plan but noted the government is trying to control her mind by "suiciding" her (that she described as mental pain). No HI endorsed. Insight and judgement are limited. 
Engaged in psychotherapy session. Focused on intensity and frequency of pt’s beliefs about sanctions. As session began, writer commended pt on tolerating morning group over the past several days despite feeling in distress. Discussed the “volume” of pt’s thoughts about her sanctions. In group, pt reported the thoughts about sanctions were “low” and during session today, they increased in volume to “medium.” Engaged pt in conversation about food to build rapport. During session, pt stood up because she reported that the sanctions instructed her to do so. Pt reported that the most she speaks, the louder the sanctions become and the only time they quiet down occurs at night. Attempted to establish connection between pt’s thoughts about the sanctions and their effect over her (the more she thinks about them, the more powerful the thoughts become).     Patient participated in psychotherapy session. Patient presented with adequate grooming and was casually dressed. Patient maintained appropriate eye contact and demonstrated a cooperative attitude. No abnormal motor movements noted. Patient's mood was euthymic with labile affect. Speech was within normal limits. No perceptual disturbances noted or observed. Patient's thought process was better organized but perseverative on sanctions. Patient's thought content was significant for beliefs about government sanctions. No active suicidal ideation/intent/plan elicited. No HI endorsed. Insight and judgement are limited.

## 2022-03-08 NOTE — BH PSYCHOLOGY - CLINICIAN PSYCHOTHERAPY NOTE - NSBHPSYCHOLPROBSFT_PSY_ALL_CORE
Anxiety, Depression, Emotion dysregulation, Lack of coping skills 

## 2022-03-09 PROCEDURE — 90853 GROUP PSYCHOTHERAPY: CPT

## 2022-03-09 PROCEDURE — 99232 SBSQ HOSP IP/OBS MODERATE 35: CPT

## 2022-03-09 RX ORDER — CLONAZEPAM 1 MG
1 TABLET ORAL
Refills: 0 | Status: DISCONTINUED | OUTPATIENT
Start: 2022-03-09 | End: 2022-03-15

## 2022-03-09 RX ADMIN — Medication 150 MILLIGRAM(S): at 09:01

## 2022-03-09 RX ADMIN — ARIPIPRAZOLE 20 MILLIGRAM(S): 15 TABLET ORAL at 10:38

## 2022-03-09 RX ADMIN — PALIPERIDONE 6 MILLIGRAM(S): 1.5 TABLET, EXTENDED RELEASE ORAL at 21:14

## 2022-03-09 NOTE — BH INPATIENT PSYCHIATRY PROGRESS NOTE - NSBHASSESSSUMMFT_PSY_ALL_CORE
Pt calmer today, still delusions about the government and ideas of reference    Plan:  >Legal: 9.39  >Obs: Routine checks appropriate--visible on the unit, no history of aggression on inpatient unit.  No current SI. No need for CO, patient not expected to pose risk to self or others in controlled inpatient setting.  >Psychiatric Meds: outpatient medication regimen: Effexor XR titrated to 150mg PO daily.  Taper down Abilify to 20mg PO daily on 3/9 and Invega 6mg PO at bedtime   > Labs: Admission labs were reviewed, unremarkable. Follow up with Medical team as needed.   >Medical Comorbidities: No acute concerns. No consultations needed at this time. Patient with consistently stable VS,. Admission labs reviewed, no acute findings.   >Diet: Regular  >Social: milieu therapy  >Treatment Interventions: Groups and Individual Therapy/CBT, Motivational counseling for substance abuse related issues.   >Dispo: Collateral and dispo planning pending further symptom and medication optimization

## 2022-03-09 NOTE — BH INPATIENT PSYCHIATRY PROGRESS NOTE - NSBHMETABOLIC_PSY_ALL_CORE_FT
BMI:   HbA1c: A1C with Estimated Average Glucose Result: 5.0 % (02-27-22 @ 10:45)    Glucose:   BP: 106/66 (03-08-22 @ 07:46) (106/66 - 119/73)  Lipid Panel: Date/Time: 02-27-22 @ 10:45  Cholesterol, Serum: 250  Direct LDL: --  HDL Cholesterol, Serum: 86  Total Cholesterol/HDL Ration Measurement: --  Triglycerides, Serum: 48

## 2022-03-09 NOTE — BH INPATIENT PSYCHIATRY PROGRESS NOTE - NSBHCHARTREVIEWVS_PSY_A_CORE FT
Vital Signs Last 24 Hrs  T(C): 36.9 (03-09-22 @ 07:48), Max: 36.9 (03-09-22 @ 07:48)  T(F): 98.4 (03-09-22 @ 07:48), Max: 98.4 (03-09-22 @ 07:48)  HR: --  BP: --  BP(mean): --  RR: --  SpO2: --    Orthostatic VS  03-09-22 @ 07:48  Lying BP: --/-- HR: --  Sitting BP: 122/68 HR: 94  Standing BP: --/-- HR: --  Site: --  Mode: --

## 2022-03-09 NOTE — BH INPATIENT PSYCHIATRY PROGRESS NOTE - NSBHFUPINTERVALHXFT_PSY_A_CORE
Pt compliant with medication and tolerating it well.  Chart reviewed and case discussed with treatment team.  No events reported overnight.  Pt initially says, "they stopped the mental sanctions."  Pt reports she has not been "suicided."  Pt then says that the government is still playing games and they are not going to stop.  Pt reports people are at war and lives are at risk, so the government has to do the games to her.  Pt reports it involves hundreds of people and hundreds of people would have to agree for the games to stop.  Pt reports that the government uses her to hurt people and each time she does whatever the game is, someone gets sanctioned.  Pt reports they will keep using her until they solve all the wars.  Pt reports she feels Invega has helped her stress level and she feels calmer.  Pt reports the government gets mad at her stress response.  Pt reports if she reacts to the news, it's a leak, and if she does not respond immediately, the government prefers that because it is more private.

## 2022-03-09 NOTE — BH INPATIENT PSYCHIATRY PROGRESS NOTE - CURRENT MEDICATION
MEDICATIONS  (STANDING):  ARIPiprazole 20 milliGRAM(s) Oral daily  paliperidone ER 6 milliGRAM(s) Oral at bedtime  venlafaxine  milliGRAM(s) Oral daily    MEDICATIONS  (PRN):  clonazePAM  Tablet 1 milliGRAM(s) Oral two times a day PRN anxiety  diphenhydrAMINE 50 milliGRAM(s) Oral every 6 hours PRN eps ppx/agitation  diphenhydrAMINE Injectable 50 milliGRAM(s) IntraMuscular once PRN eps ppx/severe agitationeps ppx/severe agitation  haloperidol     Tablet 5 milliGRAM(s) Oral every 6 hours PRN agitation  haloperidol    Injectable 5 milliGRAM(s) IntraMuscular once PRN severe agitation  LORazepam   Injectable 2 milliGRAM(s) IntraMuscular once PRN agitation

## 2022-03-10 PROCEDURE — 90853 GROUP PSYCHOTHERAPY: CPT

## 2022-03-10 PROCEDURE — 99231 SBSQ HOSP IP/OBS SF/LOW 25: CPT

## 2022-03-10 RX ORDER — ARIPIPRAZOLE 15 MG/1
10 TABLET ORAL DAILY
Refills: 0 | Status: COMPLETED | OUTPATIENT
Start: 2022-03-11 | End: 2022-03-12

## 2022-03-10 RX ADMIN — ARIPIPRAZOLE 20 MILLIGRAM(S): 15 TABLET ORAL at 08:46

## 2022-03-10 RX ADMIN — Medication 150 MILLIGRAM(S): at 08:46

## 2022-03-10 RX ADMIN — PALIPERIDONE 6 MILLIGRAM(S): 1.5 TABLET, EXTENDED RELEASE ORAL at 20:25

## 2022-03-10 NOTE — BH TREATMENT PLAN - NSTXDCOPNODATEEST_PSY_ALL_CORE
28-Feb-2022
CXR 12/31: small L PTX s/p trach dislodged/replaced  Repeat CXR 12/31 stable   CXR today 1/1 stable from prior   No indication for pigtail placement at this time   Continue to monitor w/ daily CXR  Plan of care d/w Dr. Calvillo   Continue care as per primary team
28-Feb-2022
07-Mar-2022

## 2022-03-10 NOTE — BH TREATMENT PLAN - NSCMSPTSTRENGTHS_PSY_ALL_CORE
Expressive of emotions/Intact family
Expressive of emotions/Financial stability/Intact family/Physically healthy/Strong support system/Supportive family
Expressive of emotions/Financial stability/Intact family/Leisure interest/Physically healthy/Strong support system/Supportive family

## 2022-03-10 NOTE — BH TREATMENT PLAN - NSTXDEPRESINTERRN_PSY_ALL_CORE
Maintain a therapeutic presence; utilize calm tone of voice, nonjudgmental attitude and nonthreatening body language; listen carefully.

## 2022-03-10 NOTE — BH TREATMENT PLAN - NSTXDISORGGOAL_PSY_ALL_CORE
Will demonstrate related thoughts for 5 min in conversation
Will make at least 3 goal and reality oriented statements during therapy
Will make at least 3 goal and reality oriented statements during therapy

## 2022-03-10 NOTE — BH INPATIENT PSYCHIATRY PROGRESS NOTE - NSBHCHARTREVIEWVS_PSY_A_CORE FT
Vital Signs Last 24 Hrs  T(C): 37.1 (03-10-22 @ 08:15), Max: 37.1 (03-10-22 @ 08:15)  T(F): 98.8 (03-10-22 @ 08:15), Max: 98.8 (03-10-22 @ 08:15)  HR: 82 (03-10-22 @ 08:15) (82 - 82)  BP: 112/62 (03-10-22 @ 08:15) (112/62 - 112/62)  BP(mean): --  RR: --  SpO2: --    Orthostatic VS  03-09-22 @ 07:48  Lying BP: --/-- HR: --  Sitting BP: 122/68 HR: 94  Standing BP: --/-- HR: --  Site: --  Mode: --

## 2022-03-10 NOTE — BH INPATIENT PSYCHIATRY PROGRESS NOTE - NSBHFUPINTERVALHXFT_PSY_A_CORE
Pt compliant with medication and tolerating it well.  Chart reviewed and case discussed with treatment team.  No events reported overnight.  Pt reports the Invega has taken her panic away, she is happier and feels more relaxed.  Pt reports good sleep and appetite.  Pt reports she does not have any sanctions on her and she has not been "suicided."  Pt reports, "I am almost positive the mental sanctions are not going to happen, the person who had control doesn't anymore."

## 2022-03-10 NOTE — BH INPATIENT PSYCHIATRY PROGRESS NOTE - NSBHASSESSSUMMFT_PSY_ALL_CORE
Pt calmer today, improving delusions    Plan:  >Legal: 9.39  >Obs: Routine checks appropriate--visible on the unit, no history of aggression on inpatient unit.  No current SI. No need for CO, patient not expected to pose risk to self or others in controlled inpatient setting.  >Psychiatric Meds: outpatient medication regimen: Effexor XR titrated to 150mg PO daily.  Taper down Abilify to 10mg PO daily on 3/10 x2 days then discontinue and Invega 6mg PO at bedtime   > Labs: Admission labs were reviewed, unremarkable. Follow up with Medical team as needed.   >Medical Comorbidities: No acute concerns. No consultations needed at this time. Patient with consistently stable VS,. Admission labs reviewed, no acute findings.   >Diet: Regular  >Social: milieu therapy  >Treatment Interventions: Groups and Individual Therapy/CBT, Motivational counseling for substance abuse related issues.   >Dispo: Collateral and dispo planning pending further symptom and medication optimization

## 2022-03-10 NOTE — BH TREATMENT PLAN - NSTXDCOPNOINTERSW_PSY_ALL_CORE
Support and psychoed to be provided and d/c plan to be arranged.
Support and psychoed to be provided and d/c plan to be arranged.
Patient will comply with group therapy,SW will meet with patient daily while maintaining contact with collateral's.

## 2022-03-10 NOTE — BH INPATIENT PSYCHIATRY PROGRESS NOTE - NSBHMETABOLIC_PSY_ALL_CORE_FT
BMI:   HbA1c: A1C with Estimated Average Glucose Result: 5.0 % (02-27-22 @ 10:45)    Glucose:   BP: 112/62 (03-10-22 @ 08:15) (106/66 - 112/62)  Lipid Panel: Date/Time: 02-27-22 @ 10:45  Cholesterol, Serum: 250  Direct LDL: --  HDL Cholesterol, Serum: 86  Total Cholesterol/HDL Ration Measurement: --  Triglycerides, Serum: 48

## 2022-03-10 NOTE — BH TREATMENT PLAN - NSTXPATIENTPARTICIPATE_PSY_ALL_CORE
Patient participated in defining interventions
Patient participated in identification of needs/problems/goals for treatment/Patient participated in defining interventions/Patient participated in development of after care plan
Patient participated in defining interventions/Patient participated in development of after care plan

## 2022-03-10 NOTE — BH TREATMENT PLAN - NSTXPSYCHOINTERRN_PSY_ALL_CORE
Monitor for psychotic symptoms. Provide redirection and reorientation. Provide PRN medications as needed.
Promote use of personal vision and auditory aids; provide orientation cues (e.g., calendar, clock, pictures).

## 2022-03-10 NOTE — BH TREATMENT PLAN - NSTXDISORGINTERRN_PSY_ALL_CORE
Monitor for disorganized behavior. Provide redirection and reorientation. Provide PRN medications as needed.
Avoid reinforcing delusional thoughts; respond to the verbal tone rather than the content; provide comfort and reassurance.

## 2022-03-10 NOTE — BH TREATMENT PLAN - NSTXDISORGINTERPR_PSY_ALL_CORE
Psychiatric Rehabiltation staff will continue to encourage patient to attend groups and meet with staff individually in order to make at least three reality based statements within seven days.
Psychiatric Rehabiltation staff will continue to encourage patient to attend groups and meet with staff individually in order to make at least three reality based statements within seven days.
Patient has been working on psych rehab goals pertaining to make 3 statements based in reality however patient has not made progress.

## 2022-03-10 NOTE — BH TREATMENT PLAN - NSTXDEPRESGOAL_PSY_ALL_CORE
Exhibit improvements in self-grooming, hygiene, sleep and appetite
Exhibit improvements in self-grooming, hygiene, sleep and appetite
Will identify 2 coping skills that assist in improving mood

## 2022-03-10 NOTE — BH TREATMENT PLAN - NSTXPLANTHERAPYSESSIONSFT_PSY_ALL_CORE
03-05-22  Type of therapy: Cognitive behavior therapy,Dialectical behavior therapy,Coping skills,Psychoeducation,groups, however has difficulty tolerating the duration and structure of group therapy.    Type of session: Individual  Level of patient participation: Participated with encouragement  Duration of participation: 15 minutes  Therapy conducted by: Psych rehab  Therapy Summary: Writer and patient met for weekly psych rehab assessment.  Patient reported the government continues to control her brain and became tearful when writer prompted further. Patient was unable to make 3 statements based in reality during weekly assessment due to symptoms of psychosis.  Patient was unable to be assessed for SI/HI as well as AH/VH, as patient appeared to symptomatic  during assessment.  Patient continues to be observed delusional and psychotic, presenting with referential ideation.  Patient is observed with fair ADLs and appearance and reported she has been showering daily.  Patient is observed on the unit, minimally socializing with peers.    During the past seven patient has attended 25% of daily psych rehab groups.  Patient is calm in groups, however has difficulty tolerating the duration and structure of group therapy.

## 2022-03-10 NOTE — BH TREATMENT PLAN - NSTXCAREGIVERPARTICIPATE_PSY_P_CORE
Family/Caregiver participated in defining interventions/Family/Caregiver participated in development of after care plan
Family/Caregiver participated in identification of needs/problems/goals for treatment/Family/Caregiver participated in defining interventions/Family/Caregiver participated in development of after care plan

## 2022-03-10 NOTE — BH TREATMENT PLAN - NSTXPSYCHOGOAL_PSY_ALL_CORE
Will identify 2 coping skills that assist with focus on reality
Will engage in a 15 minute conversation with no irrational content
Will engage in a 15 minute conversation with no irrational content

## 2022-03-11 PROCEDURE — 90853 GROUP PSYCHOTHERAPY: CPT

## 2022-03-11 PROCEDURE — 99232 SBSQ HOSP IP/OBS MODERATE 35: CPT

## 2022-03-11 RX ORDER — PALIPERIDONE 1.5 MG/1
9 TABLET, EXTENDED RELEASE ORAL AT BEDTIME
Refills: 0 | Status: DISCONTINUED | OUTPATIENT
Start: 2022-03-12 | End: 2022-03-15

## 2022-03-11 RX ADMIN — ARIPIPRAZOLE 10 MILLIGRAM(S): 15 TABLET ORAL at 08:54

## 2022-03-11 RX ADMIN — HALOPERIDOL DECANOATE 5 MILLIGRAM(S): 100 INJECTION INTRAMUSCULAR at 10:41

## 2022-03-11 RX ADMIN — PALIPERIDONE 6 MILLIGRAM(S): 1.5 TABLET, EXTENDED RELEASE ORAL at 20:36

## 2022-03-11 RX ADMIN — Medication 150 MILLIGRAM(S): at 08:54

## 2022-03-11 NOTE — ED PROVIDER NOTE - MDM ORDERS SUBMITTED SELECTION
Patient cancelled appointment on 3/29/22 with Pippa Haas CNP for 6 week. Reason: pt is currently admitted for heart issues and will be discharged to nursing home or assisted living. Pt heart is \"getting worse and will need surgery. I can't use the machine anymore due to my heart getting worse\". Patient did not reschedule appointment. Appointment rescheduled for n/a. Last VV 12/10/21:    Assessment:       · Severe HENNA. BIPAP 14/8 cm H2O. patient not tolerating BiPAP, states he is using O2 at night currently  · Hypersomnia- sedating medications, poor sleep hygiene, co morbid conditions, likely contributing-improving. · Irregular sleep pattern due to pain  · Poor sleep hygiene  · Obesity   · Cardiomyopathy, CAD s/p CABG x3, pulmonary HTN, neuropathy, DM, HTN  · Chronic pain- followed by pain management.     Plan:       -BiPAP titration as patient is not tolerating BiPAP and to evaluate for O2 needs if any  -Discussed severity of sleep apnea and importance of BiPAP use  -Supply order to DME of patient's choice  -Chin strap if not using full face mask  - Advised to use BiPAP 6-8 hrs at night and during naps-continue to increase use. - Replacement of mask, tubing, head straps every 3-6 months or sooner if damaged. - Patient instructed to contact DME company for any mask, tubing or machine trouble shooting if problems arise.  - Sleep hygiene  -Continue to work with pain management for chronic pain  -Cognitive behavioral therapy was discussed with patient including stimulus control and sleep restriction   -Recommend set bed/wake times, no naps during the daytime. Use BiPAP when sleeping.  - Avoid sedatives, alcohol and caffeinated drinks at bed time. - Patient counseled to never drive or operate heavy machinery while fatigue, drowsy or sleepy- patient verbalized understanding and agrees.    - Weight loss is recommended as a long-term intervention.    - Complications of HENNA if not treated were discussed with patient patient, including: systemic hypertension, pulmonary hypertension, cardiovascular morbidities, car accidents and all cause mortality.  -Patient education regarding sleep tips and PAP cleaning recommendations Labs/EKG

## 2022-03-11 NOTE — BH INPATIENT PSYCHIATRY PROGRESS NOTE - NSBHCHARTREVIEWVS_PSY_A_CORE FT
Vital Signs Last 24 Hrs  T(C): 36.6 (03-11-22 @ 06:31), Max: 36.6 (03-11-22 @ 06:31)  T(F): 97.8 (03-11-22 @ 06:31), Max: 97.8 (03-11-22 @ 06:31)  HR: --  BP: 107/66 (03-11-22 @ 06:31) (107/66 - 107/66)  BP(mean): 87 (03-11-22 @ 06:31) (87 - 87)  RR: --  SpO2: --

## 2022-03-11 NOTE — BH INPATIENT PSYCHIATRY PROGRESS NOTE - NSBHASSESSSUMMFT_PSY_ALL_CORE
Pt calmer today, improving delusions    Plan:  >Legal: 9.39  >Obs: Routine checks appropriate--visible on the unit, no history of aggression on inpatient unit.  No current SI. No need for CO, patient not expected to pose risk to self or others in controlled inpatient setting.  >Psychiatric Meds: outpatient medication regimen: Effexor XR titrated to 150mg PO daily.  Taper down Abilify to 10mg PO daily on 3/10 x2 days then discontinue and Invega 6mg PO at bedtime on 3/11 and titrate to Invega 9mg PO at bedtime on 3/12  > Labs: Admission labs were reviewed, unremarkable. Follow up with Medical team as needed.   >Medical Comorbidities: No acute concerns. No consultations needed at this time. Patient with consistently stable VS,. Admission labs reviewed, no acute findings.   >Diet: Regular  >Social: milieu therapy  >Treatment Interventions: Groups and Individual Therapy/CBT, Motivational counseling for substance abuse related issues.   >Dispo: Collateral and dispo planning pending further symptom and medication optimization

## 2022-03-11 NOTE — BH INPATIENT PSYCHIATRY PROGRESS NOTE - NSBHFUPINTERVALHXFT_PSY_A_CORE
Pt compliant with medication and tolerating it well.  Chart reviewed and case discussed with treatment team.  No events reported overnight.  Pt reports she does not have any sanctions on her and she has not been "suicided."  Pt reports she continues to feel at times that there are "games and frames" going on.  Pt asks treatment team to get rid of them like they got rid of the sanctions.  Pt agreeable to titrate Invega PO tomorrow night.  Pt reports she continues to feel calmer and more relaxed on the new medication.  Pt reports she feels lonely and has been isolating for a long time because of the sanctions and she wants to learn how to meet new people and not isolate.  Pt agreeable to go back to [facility].  Pt also reports she has a gym membership and she will engage in workout classes to help her socialize and not isolate.

## 2022-03-12 RX ADMIN — HALOPERIDOL DECANOATE 5 MILLIGRAM(S): 100 INJECTION INTRAMUSCULAR at 09:52

## 2022-03-12 RX ADMIN — PALIPERIDONE 9 MILLIGRAM(S): 1.5 TABLET, EXTENDED RELEASE ORAL at 20:21

## 2022-03-12 RX ADMIN — Medication 150 MILLIGRAM(S): at 08:32

## 2022-03-12 RX ADMIN — ARIPIPRAZOLE 10 MILLIGRAM(S): 15 TABLET ORAL at 08:32

## 2022-03-13 RX ADMIN — PALIPERIDONE 9 MILLIGRAM(S): 1.5 TABLET, EXTENDED RELEASE ORAL at 22:09

## 2022-03-13 RX ADMIN — Medication 150 MILLIGRAM(S): at 08:48

## 2022-03-14 PROCEDURE — 99231 SBSQ HOSP IP/OBS SF/LOW 25: CPT

## 2022-03-14 PROCEDURE — 90853 GROUP PSYCHOTHERAPY: CPT

## 2022-03-14 RX ORDER — VENLAFAXINE HCL 75 MG
1 CAPSULE, EXT RELEASE 24 HR ORAL
Qty: 14 | Refills: 0
Start: 2022-03-14

## 2022-03-14 RX ORDER — PALIPERIDONE 1.5 MG/1
1 TABLET, EXTENDED RELEASE ORAL
Qty: 14 | Refills: 0
Start: 2022-03-14

## 2022-03-14 RX ADMIN — Medication 150 MILLIGRAM(S): at 09:04

## 2022-03-14 RX ADMIN — PALIPERIDONE 9 MILLIGRAM(S): 1.5 TABLET, EXTENDED RELEASE ORAL at 21:13

## 2022-03-14 NOTE — BH DISCHARGE NOTE NURSING/SOCIAL WORK/PSYCH REHAB - NSDCPRRECOMMEND_PSY_ALL_CORE
Writer recommended that patient demonstrate full compliance with outpatient psychiatric treatment and that she expands her knowledge and practice of personally relevant coping methods for improved symptom management. Upon discharge, patient would benefit from outpatient psychiatric treatment for ongoing medication management, individual psychotherapy, and support.

## 2022-03-14 NOTE — BH INPATIENT PSYCHIATRY PROGRESS NOTE - NSBHFUPINTERVALHXFT_PSY_A_CORE
Pt compliant with medication and tolerating it well.  Chart reviewed and case discussed with treatment team.  No events reported overnight.  Pt observed social with peers and attending AM group.  Pt calm and cooperative with interview.  Pt reports she continues to feel calmer without depression or anxiety.  Pt reports she has not been sanctioned or "suicided."  Pt reports the games have stopped.  Pt denies SI/HI/I/P or AH/VH.  Pt reports eating and sleeping well.  Pt retracted her 72 hour letter today, but would like to be discharged soon.  Pt agrees to go back to [facility] in [place].  Pt declining Invega BUSTILLOS at this time, reports she will consider it at [facility] or in the future.  Pt reports she plans to take her pills daily.

## 2022-03-14 NOTE — BH DISCHARGE NOTE NURSING/SOCIAL WORK/PSYCH REHAB - NSDCPRGOAL_PSY_ALL_CORE
Patient has attended approximately 40% of daily psychiatric rehabilitation groups during the current hospitalization. Patient demonstrated daily medication compliance and attended to ADL fairly; appearance/hygiene were continuously adequate. Patient has also been significantly more organized and reciprocal during interactions with writer. Patient’s insight into recovery/rehabilitation remains fairly limited but judgment has shown appreciable improvement. Patient reported intent to maintain treatment compliance post-discharge and she identified plans to spend more time with her family as a measure of daily self-care; patient was uncertain regarding specific short-term or long-term goals. Patient denied suicidal ideation, homicidal ideation, or hallucinations; patient remains bizarre and paranoid but overt evidence of the same has decreased significantly.

## 2022-03-14 NOTE — BH INPATIENT PSYCHIATRY PROGRESS NOTE - NSBHASSESSSUMMFT_PSY_ALL_CORE
Pt calm today, improving delusions    Plan:  >Legal: 9.39  >Obs: Routine checks appropriate--visible on the unit, no history of aggression on inpatient unit.  No current SI. No need for CO, patient not expected to pose risk to self or others in controlled inpatient setting.  >Psychiatric Meds: outpatient medication regimen: Effexor XR titrated to 150mg PO daily, Invega 9mg PO at bedtime on 3/12  > Labs: Admission labs were reviewed, unremarkable. Follow up with Medical team as needed.   >Medical Comorbidities: No acute concerns. No consultations needed at this time. Patient with consistently stable VS,. Admission labs reviewed, no acute findings.   >Diet: Regular  >Social: milieu therapy  >Treatment Interventions: Groups and Individual Therapy/CBT, Motivational counseling for substance abuse related issues.   >Dispo: Collateral and dispo planning pending further symptom and medication optimization

## 2022-03-14 NOTE — BH INPATIENT PSYCHIATRY PROGRESS NOTE - NSBHMETABOLIC_PSY_ALL_CORE_FT
BMI:   HbA1c: A1C with Estimated Average Glucose Result: 5.0 % (02-27-22 @ 10:45)    Glucose:   BP: 116/67 (03-12-22 @ 08:19) (116/67 - 116/67)  Lipid Panel: Date/Time: 02-27-22 @ 10:45  Cholesterol, Serum: 250  Direct LDL: --  HDL Cholesterol, Serum: 86  Total Cholesterol/HDL Ration Measurement: --  Triglycerides, Serum: 48

## 2022-03-14 NOTE — BH DISCHARGE NOTE NURSING/SOCIAL WORK/PSYCH REHAB - NSCDUDCCRISIS_PSY_A_CORE
[facility]  1 (525) [facility]-WELL ([phone])  Text "WELL" to 41758  Website: [email or web address].com/.[employer] 1 (661) 671-LCTK (9056) Website: www.safehorizon.org/.National Suicide Prevention Lifeline 2 (731) 493-8674/.  [employer]  1 (916) [employer] (714-0803)/.  [facility]  [address] [place] [address] [phone]   Hours:  Monday through Friday from 9 AM to 3 PM/.  [employer] - [facility] Crisis Line  7 [phone], Option 1

## 2022-03-14 NOTE — BH DISCHARGE NOTE NURSING/SOCIAL WORK/PSYCH REHAB - DISCHARGE INSTRUCTIONS AFTERCARE APPOINTMENTS
In order to check the location, date, or time of your aftercare appointment, please refer to your Discharge Instructions Document given to you upon leaving the hospital.  If you have lost the instructions please call [phone]

## 2022-03-14 NOTE — BH INPATIENT PSYCHIATRY PROGRESS NOTE - CURRENT MEDICATION
MEDICATIONS  (STANDING):  paliperidone ER 9 milliGRAM(s) Oral at bedtime  venlafaxine  milliGRAM(s) Oral daily    MEDICATIONS  (PRN):  clonazePAM  Tablet 1 milliGRAM(s) Oral two times a day PRN anxiety  diphenhydrAMINE 50 milliGRAM(s) Oral every 6 hours PRN eps ppx/agitation  diphenhydrAMINE Injectable 50 milliGRAM(s) IntraMuscular once PRN eps ppx/severe agitationeps ppx/severe agitation  haloperidol     Tablet 5 milliGRAM(s) Oral every 6 hours PRN agitation  haloperidol    Injectable 5 milliGRAM(s) IntraMuscular once PRN severe agitation  LORazepam   Injectable 2 milliGRAM(s) IntraMuscular once PRN agitation

## 2022-03-14 NOTE — BH DISCHARGE NOTE NURSING/SOCIAL WORK/PSYCH REHAB - PATIENT PORTAL LINK FT
You can access the FollowMyHealth Patient Portal offered by [facility] by registering at the following website: [email or web address]. By joining [employer]’s FollowMyHealth portal, you will also be able to view your health information using other applications (apps) compatible with our system.

## 2022-03-14 NOTE — BH INPATIENT PSYCHIATRY PROGRESS NOTE - NSBHCHARTREVIEWVS_PSY_A_CORE FT
Vital Signs Last 24 Hrs  T(C): 36.7 (03-13-22 @ 18:50), Max: 36.7 (03-13-22 @ 18:50)  T(F): 98 (03-13-22 @ 18:50), Max: 98 (03-13-22 @ 18:50)  HR: --  BP: --  BP(mean): --  RR: --  SpO2: --    Orthostatic VS  03-13-22 @ 08:03  Lying BP: --/-- HR: --  Sitting BP: 110/74 HR: 82  Standing BP: --/-- HR: --  Site: --  Mode: --

## 2022-03-15 VITALS — SYSTOLIC BLOOD PRESSURE: 101 MMHG | TEMPERATURE: 98 F | HEART RATE: 78 BPM | DIASTOLIC BLOOD PRESSURE: 65 MMHG

## 2022-03-15 PROCEDURE — 90853 GROUP PSYCHOTHERAPY: CPT

## 2022-03-15 PROCEDURE — 99238 HOSP IP/OBS DSCHRG MGMT 30/<: CPT

## 2022-03-15 RX ADMIN — Medication 150 MILLIGRAM(S): at 08:19

## 2022-03-15 NOTE — BH INPATIENT PSYCHIATRY DISCHARGE NOTE - NSBHMETABOLIC_PSY_ALL_CORE_FT
BMI:   HbA1c: A1C with Estimated Average Glucose Result: 5.0 % (02-27-22 @ 10:45)    Glucose:   BP: 101/65 (03-15-22 @ 07:37) (101/65 - 101/65)  Lipid Panel: Date/Time: 02-27-22 @ 10:45  Cholesterol, Serum: 250  Direct LDL: --  HDL Cholesterol, Serum: 86  Total Cholesterol/HDL Ration Measurement: --  Triglycerides, Serum: 48

## 2022-03-15 NOTE — BH PSYCHOLOGY - GROUP THERAPY NOTE - NSBHPSYCHOLPARTICIP_PSY_A_CORE
Fully engaged
Partially engaged
Fully engaged
Partially engaged
Fully engaged
Partially engaged

## 2022-03-15 NOTE — BH INPATIENT PSYCHIATRY DISCHARGE NOTE - HOSPITAL COURSE
On the unit, patient reported she continued to feel the government sent her to the hospital due to mental sanctions.  She reported the government also “suicided” her and she felt games were going on in the mileu.  Pt reported she gets code from the government through the news and TV and they were always going to watch her as long as celebrities were in war after her.  Pt appeared visible depressed and anxious from this.  Pt was unable to tolerate groups because she felt the people running groups were doing games.  Pt was continued on Effexor XR and Abilify and initially refused to take PO PRN medication for anxiety or to change psychotropic medication due to belief that other medications had breast cancer or other diseases in them.  Effexor XR was titrated to 150mg PO daily and Abilify titrated to 30mg PO daily.  Pt had minimal improvement in psychotic sx on Abilify and patient eventually agreed to try Invega.  Abilify was tapered down and discontinue as Invega was added and titrated to 9mg PO at bedtime.  Pt with bilateral hand tremor and refused Cogentin, reported it was not distressing.  On the combination of medication, patient showed much improvement in mood and psychotic sx.  Pt was able to tolerate groups and individual therapy sessions.  Treatment team was unable to get authorization for Invega BUSTILLOS as patient with out of state insurance.   Treatment team explained recommendation of Invega BUSTILLOS at [facility] and patient declining at this time, but reported she will continue medication as prescribed and agreed to attend [facility] after discharge.  Pt also reported she wanted to live a normal life and socialize more and planned on going back to her gym and joining classes there for socialization.  Pt reported long term goal of getting back to work.  Pt’s  was contacted by the treatment team throughout her hospitalization and he was in agreement with discharge plan and expressed no concerns with patient being compliant with medication on an outpatient basis.  On discharge, patient without SI/HI/I/P or AH/VH.  Pt eating and sleeping well.  Pt endorsed motivation to continue medication as prescribed and engage in treatment at [facility].  Safety planning done with patient and patient agreed to call 911 or go to the nearest ED if she finds herself a danger to herself or others.  Suicide hotline information give to patient with discharge documentation.     Although patient was admitted due to risk factors for violence/suicide including psychotic and mood sx, the patient’s risk for suicide/violence was mitigated during her hospitalization and her protective factors outweigh her risk factors at this time.  Pt is currently at low acute risk for suicide/violence.  Patient’s protective factors include:  no current SI/HI/I/P, willingness to engage in treatment at [facility], responsibility to family, family support, no hx of suicide attempts, no hx of aggression, no current substance abuse, residential stability, future orientation, and no current acute depressive, psychotic or manic sx.  Although the patient is at chronic risk for violence/suicide given her hx of psychiatric illness , hx of psychiatric hospitalizations and hx of trauma, this risk cannot be ameliorated by continued inpatient treatment.  The patient no longer met the criteria for psychiatric hospitalization at discharge.

## 2022-03-15 NOTE — BH PSYCHOLOGY - GROUP THERAPY NOTE - NSPSYCHOLGRPCOGPROB_PSY_A_CORE FT
Anxiety, Depression, Emotion dysregulation, Lack of coping skills 

## 2022-03-15 NOTE — BH INPATIENT PSYCHIATRY PROGRESS NOTE - NSTXPROBDISORG_PSY_ALL_CORE
DISORGANIZATION OF THOUGHT/BEHAVIOR

## 2022-03-15 NOTE — BH INPATIENT PSYCHIATRY PROGRESS NOTE - NSTXDISORGPROGRES_PSY_ALL_CORE
Met - goal discontinued
Met - goal discontinued
2
No Change
No Change
Improving
No Change
Improving
Improving
Met - goal discontinued

## 2022-03-15 NOTE — BH INPATIENT PSYCHIATRY PROGRESS NOTE - NSTXDISORGDATEEST_PSY_ALL_CORE
26-Feb-2022
28-Feb-2022
02-Mar-2022
26-Feb-2022
28-Feb-2022
09-Mar-2022
28-Feb-2022
02-Mar-2022
28-Feb-2022
02-Mar-2022
09-Mar-2022
28-Feb-2022
26-Feb-2022

## 2022-03-15 NOTE — BH INPATIENT PSYCHIATRY PROGRESS NOTE - NSTXDISORGDATETRGT_PSY_ALL_CORE
12-Mar-2022
12-Mar-2022
16-Mar-2022
05-Mar-2022
09-Mar-2022
05-Mar-2022
05-Mar-2022
12-Mar-2022
09-Mar-2022
09-Mar-2022
16-Mar-2022

## 2022-03-15 NOTE — BH INPATIENT PSYCHIATRY PROGRESS NOTE - NSTXDCOPNODATETRGT_PSY_ALL_CORE
14-Mar-2022
07-Mar-2022
07-Mar-2022
14-Mar-2022
07-Mar-2022
07-Mar-2022
14-Mar-2022
07-Mar-2022
14-Mar-2022

## 2022-03-15 NOTE — BH PSYCHOLOGY - GROUP THERAPY NOTE - NSPSYCHOLGRPCOGINT_PSY_A_CORE FT
Acceptance & commitment therapy, Emotion regulation/coping skills taught, Psychoeducation 

## 2022-03-15 NOTE — BH INPATIENT PSYCHIATRY PROGRESS NOTE - NSBHCONTPROVIDER_PSY_ALL_CORE
No, attempted...

## 2022-03-15 NOTE — BH INPATIENT PSYCHIATRY PROGRESS NOTE - NSTXDCOPNODATENEW_PSY_ALL_CORE
07-Mar-2022

## 2022-03-15 NOTE — BH INPATIENT PSYCHIATRY PROGRESS NOTE - NSTXDEPRESGOAL_PSY_ALL_CORE
Will identify 2 coping skills that assist in improving mood
Exhibit improvements in self-grooming, hygiene, sleep and appetite
Will identify 2 coping skills that assist in improving mood
Exhibit improvements in self-grooming, hygiene, sleep and appetite
Will identify 2 coping skills that assist in improving mood
Exhibit improvements in self-grooming, hygiene, sleep and appetite
Will identify 2 coping skills that assist in improving mood

## 2022-03-15 NOTE — BH INPATIENT PSYCHIATRY PROGRESS NOTE - NSDCCRITERIA_PSY_ALL_CORE
Symptom stabilization  CGI<=3

## 2022-03-15 NOTE — BH INPATIENT PSYCHIATRY PROGRESS NOTE - NSBHMSETHTPROC_PSY_A_CORE
Disorganized/Overinclusive/Perseverative/Illogical/Impaired reasoning
Linear
Disorganized/Overinclusive/Perseverative/Illogical/Impaired reasoning
Linear
Linear/Impaired reasoning
Other
Perseverative/Impaired reasoning
Overinclusive/Perseverative/Illogical/Impaired reasoning
Perseverative/Illogical/Impaired reasoning
Perseverative/Illogical/Impaired reasoning
Disorganized/Overinclusive/Perseverative/Illogical/Impaired reasoning
Disorganized/Overinclusive/Perseverative/Illogical/Impaired reasoning
Other

## 2022-03-15 NOTE — BH INPATIENT PSYCHIATRY PROGRESS NOTE - NSBHFUPINTERVALCCFT_PSY_A_CORE
Pt seen f/u for psychosis
psychosis
Pt seen f/u for psychosis

## 2022-03-15 NOTE — BH PSYCHOLOGY - GROUP THERAPY NOTE - NSBHPSYCHOLPARTICIPCOMMENT_PSY_A_CORE FT
Pt partially engaged in group. She listened to peers but was seen leaving group and returning numerous times (perhaps in response to pt's beliefs about sanctions). She appeared interested in group discussion about two opposites both being true. 
Pt tolerated entire group despite feeling nervous at the outset. She shared feeling most alive when she meets with her  at the gym. She listened to peers, was observed smiling at times, and seemed at ease during group. 
Pt partially engaged in group. She showed increased verbal participation in group as evidenced  by reading from the group handout and responding to facilitator questions. She would like to focus on her health and plans to go to an exercise group upon d/c. 
Pt was partially engaged in group. When prompted, pt provided on topic remarks about the way that thoughts impact our behavior. 
Pt engaged in group activities. She appeared well related and was able to remain focused to create an origami bird. She wrote down a set of personal values on the wings of the bird but declined to share those values with the group. 
Pt quietly engaged in group. She participated in the group activities (mindful breathing) and supportively listened to peers as evidenced by pt's eye contact and facial expressions.
Pt partially engaged in group. She walked away for a portion of the group but did return. In looking at the values handout, pt did report that "no one is perfect, everyone has flaws" and agreed with the notion that one can have flaws and still work toward goals/values. 
Pt quietly engaged in group. She listened to peers and responded to direct questions from the facilitator. 
Pt was better engaged in group today. She was observed with brighter affect at start of group when she introduced her partner to the group and did so in a spirited way. Pt listened to group members and echoed idea that exercise/movement is a core part of her wellness. 
Pt partially engaged in group. She declined to answer check in question ("I can't answer that") and was observed standing up/sitting down throughout group.

## 2022-03-15 NOTE — BH INPATIENT PSYCHIATRY PROGRESS NOTE - NSTXDISORGGOAL_PSY_ALL_CORE
Will make at least 3 goal and reality oriented statements during therapy
Will demonstrate related thoughts for 5 min in conversation
Will demonstrate related thoughts for 5 min in conversation
Will make at least 3 goal and reality oriented statements during therapy

## 2022-03-15 NOTE — BH INPATIENT PSYCHIATRY PROGRESS NOTE - NSTXDCOPNOPROGRES_PSY_ALL_CORE
Met - goal discontinued
Improving
No Change
No Change
Met - goal discontinued
Improving
Met - goal discontinued
No Change

## 2022-03-15 NOTE — BH INPATIENT PSYCHIATRY PROGRESS NOTE - PRN MEDS
MEDICATIONS  (PRN):  clonazePAM  Tablet 1 milliGRAM(s) Oral two times a day PRN anxiety  diphenhydrAMINE 50 milliGRAM(s) Oral every 6 hours PRN eps ppx/agitation  diphenhydrAMINE Injectable 50 milliGRAM(s) IntraMuscular once PRN eps ppx/severe agitationeps ppx/severe agitation  haloperidol     Tablet 5 milliGRAM(s) Oral every 6 hours PRN agitation  haloperidol    Injectable 5 milliGRAM(s) IntraMuscular once PRN severe agitation  LORazepam   Injectable 2 milliGRAM(s) IntraMuscular once PRN agitation  
MEDICATIONS  (PRN):  diphenhydrAMINE 50 milliGRAM(s) Oral every 6 hours PRN eps ppx/agitation  diphenhydrAMINE Injectable 50 milliGRAM(s) IntraMuscular once PRN eps ppx/severe agitationeps ppx/severe agitation  haloperidol     Tablet 5 milliGRAM(s) Oral every 6 hours PRN agitation  haloperidol    Injectable 5 milliGRAM(s) IntraMuscular once PRN severe agitation  LORazepam     Tablet 2 milliGRAM(s) Oral every 6 hours PRN Agitation  LORazepam   Injectable 2 milliGRAM(s) IntraMuscular Once PRN severe agitation  
MEDICATIONS  (PRN):  clonazePAM  Tablet 0.5 milliGRAM(s) Oral two times a day PRN anxiety  diphenhydrAMINE 50 milliGRAM(s) Oral every 6 hours PRN eps ppx/agitation  diphenhydrAMINE Injectable 50 milliGRAM(s) IntraMuscular once PRN eps ppx/severe agitationeps ppx/severe agitation  haloperidol     Tablet 5 milliGRAM(s) Oral every 6 hours PRN agitation  haloperidol    Injectable 5 milliGRAM(s) IntraMuscular once PRN severe agitation  LORazepam   Injectable 2 milliGRAM(s) IntraMuscular once PRN agitation  
MEDICATIONS  (PRN):  clonazePAM  Tablet 1 milliGRAM(s) Oral two times a day PRN anxiety  diphenhydrAMINE 50 milliGRAM(s) Oral every 6 hours PRN eps ppx/agitation  diphenhydrAMINE Injectable 50 milliGRAM(s) IntraMuscular once PRN eps ppx/severe agitationeps ppx/severe agitation  haloperidol     Tablet 5 milliGRAM(s) Oral every 6 hours PRN agitation  haloperidol    Injectable 5 milliGRAM(s) IntraMuscular once PRN severe agitation  LORazepam   Injectable 2 milliGRAM(s) IntraMuscular once PRN agitation  
MEDICATIONS  (PRN):  diphenhydrAMINE 50 milliGRAM(s) Oral every 6 hours PRN eps ppx/agitation  diphenhydrAMINE Injectable 50 milliGRAM(s) IntraMuscular once PRN eps ppx/severe agitationeps ppx/severe agitation  haloperidol     Tablet 5 milliGRAM(s) Oral every 6 hours PRN agitation  haloperidol    Injectable 5 milliGRAM(s) IntraMuscular once PRN severe agitation  LORazepam     Tablet 2 milliGRAM(s) Oral every 6 hours PRN Agitation  LORazepam   Injectable 2 milliGRAM(s) IntraMuscular Once PRN severe agitation  
MEDICATIONS  (PRN):  clonazePAM  Tablet 1 milliGRAM(s) Oral two times a day PRN anxiety  diphenhydrAMINE 50 milliGRAM(s) Oral every 6 hours PRN eps ppx/agitation  diphenhydrAMINE Injectable 50 milliGRAM(s) IntraMuscular once PRN eps ppx/severe agitationeps ppx/severe agitation  haloperidol     Tablet 5 milliGRAM(s) Oral every 6 hours PRN agitation  haloperidol    Injectable 5 milliGRAM(s) IntraMuscular once PRN severe agitation  LORazepam   Injectable 2 milliGRAM(s) IntraMuscular once PRN agitation  
MEDICATIONS  (PRN):  diphenhydrAMINE 50 milliGRAM(s) Oral every 6 hours PRN eps ppx/agitation  diphenhydrAMINE Injectable 50 milliGRAM(s) IntraMuscular once PRN eps ppx/severe agitationeps ppx/severe agitation  haloperidol     Tablet 5 milliGRAM(s) Oral every 6 hours PRN agitation  haloperidol    Injectable 5 milliGRAM(s) IntraMuscular once PRN severe agitation  LORazepam     Tablet 2 milliGRAM(s) Oral every 6 hours PRN Agitation  LORazepam   Injectable 2 milliGRAM(s) IntraMuscular Once PRN severe agitation

## 2022-03-15 NOTE — BH INPATIENT PSYCHIATRY DISCHARGE NOTE - NSBHDCHANDOFFNOFT_PSY_A_CORE
Left message for call back from provider at [facility] ([phone]).  SW to fax discharge summary.  Can call Bety Gale NP at ([phone]) for handoff

## 2022-03-15 NOTE — BH PSYCHOLOGY - GROUP THERAPY NOTE - NSBHPSYCHOLGRPTYPE_PSY_A_CORE
Cognitive Behavioral Coping Skills
Supportive
Cognitive Behavioral Coping Skills

## 2022-03-15 NOTE — BH PSYCHOLOGY - GROUP THERAPY NOTE - NSBHPSYCHOLRESPONSE_PSY_A_CORE
Coping skills acquired/Accepted support

## 2022-03-15 NOTE — BH PSYCHOLOGY - GROUP THERAPY NOTE - NSPSYCHOLGRPCOGINT_PSY_A_CORE
assertiveness skills taught/other..
other..
other..
assertiveness skills taught/other..
other..
mindfulness skills taught/other..

## 2022-03-15 NOTE — BH INPATIENT PSYCHIATRY PROGRESS NOTE - NSBHMSEATTEN_PSY_A_CORE
Interface thought the RX was discontinued. RX is active.     amLODIPine (NORVASC) 10 MG tablet      Last Written Prescription Date: 2/25/17  Last Fill Quantity: 30, # refills: 0    Last Office Visit with G, P or [facility] prescribing provider:  10/31/2016     Future Office Visit:        BP Readings from Last 3 Encounters:   10/31/16 (!) 160/102   08/10/15 130/90   05/13/15 126/80     GENNARO [place]    
Normal

## 2022-03-15 NOTE — BH INPATIENT PSYCHIATRY PROGRESS NOTE - NSBHMSEBEHAV_PSY_A_CORE
Cooperative
Felice
Cooperative
Other
Other
Cooperative
Cooperative/Other
Other
Cooperative

## 2022-03-15 NOTE — BH PSYCHOLOGY - GROUP THERAPY NOTE - NSBHPSYCHOLASSESSPROV_PSY_A_CORE
Licensed Staff Psychologist

## 2022-03-15 NOTE — BH PSYCHOLOGY - GROUP THERAPY NOTE - NSPSYCHOLGRPCOGPROB_PSY_A_CORE
delusions/thought disorder/other...
delusions/thought disorder/other...
thought disorder/other...
delusions/thought disorder/other...

## 2022-03-15 NOTE — BH INPATIENT PSYCHIATRY PROGRESS NOTE - NSBHCONSBHPROVDETAILS_PSY_A_CORE  FT
Defer to primary team

## 2022-03-15 NOTE — BH PSYCHOLOGY - GROUP THERAPY NOTE - NSPSYCHOLGRPBILLING_PSY_A_CORE
[id number] - Group Psychotherapy
[id number] - Group Psychotherapy
[id number] - Group Psychotherapy
[id number] - Group Psychotherapy
[id number] - Group Psychotherapy
63349 - Group Psychotherapy
84996 - Group Psychotherapy
60389 - Group Psychotherapy
69761 - Group Psychotherapy
00105 - Group Psychotherapy

## 2022-03-15 NOTE — BH INPATIENT PSYCHIATRY PROGRESS NOTE - NSBHCONSULTIPREASON_PSY_A_CORE
danger to self; mental illness expected to respond to inpatient care
other reason
danger to self; mental illness expected to respond to inpatient care

## 2022-03-15 NOTE — BH INPATIENT PSYCHIATRY PROGRESS NOTE - NSTXPSYCHOGOAL_PSY_ALL_CORE
Will identify 2 coping skills that assist with focus on reality
Will identify 2 coping skills that assist with focus on reality
Will be able to report experiencing hallucinations to staff
Will identify 2 coping skills that assist with focus on reality
Will engage in a 15 minute conversation with no irrational content
Will identify 2 coping skills that assist with focus on reality
Will engage in a 15 minute conversation with no irrational content

## 2022-03-15 NOTE — BH INPATIENT PSYCHIATRY PROGRESS NOTE - NSBHMSETHTCONTENT_PSY_A_CORE
Delusions
Delusions/Ideas of reference
Delusions
Other
Other
Delusions/Ideas of reference
Delusions/Ideas of reference
Delusions/Ideas of reference/Preoccupations
Delusions/Ideas of reference/Preoccupations
Delusions/Ideas of reference
Other

## 2022-03-15 NOTE — BH PSYCHOLOGY - GROUP THERAPY NOTE - NSBHPSYCHOLGRPNAME_PSY_A_CORE
CBT (Cognitive Behavioral Therapy) Group
CBT (Cognitive Behavioral Therapy) Group
Supportive Group Therapy
CBT (Cognitive Behavioral Therapy) Group
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
## 2022-03-15 NOTE — BH INPATIENT PSYCHIATRY DISCHARGE NOTE - NSDCMRMEDTOKEN_GEN_ALL_CORE_FT
paliperidone 9 mg oral tablet, extended release: 1 tab(s) orally once a day (at bedtime)  venlafaxine 150 mg oral capsule, extended release: 1 cap(s) orally once a day

## 2022-03-15 NOTE — BH INPATIENT PSYCHIATRY PROGRESS NOTE - NSTXDEPRESPROGRES_PSY_ALL_CORE
Improving
Met - goal discontinued
Improving
Met - goal discontinued
Met - goal discontinued

## 2022-03-15 NOTE — BH INPATIENT PSYCHIATRY PROGRESS NOTE - NSBHMSEKNOWHOW_PSY_ALL_CORE
Current Events
Vocabulary
Current Events

## 2022-03-15 NOTE — BH INPATIENT PSYCHIATRY PROGRESS NOTE - NSBHMSESPEECH_PSY_A_CORE
Abnormal as indicated, otherwise normal...
Normal volume, rate, productivity, spontaneity and articulation
Normal volume, rate, productivity, spontaneity and articulation
Abnormal as indicated, otherwise normal...
Normal volume, rate, productivity, spontaneity and articulation
Abnormal as indicated, otherwise normal...
Normal volume, rate, productivity, spontaneity and articulation
Normal volume, rate, productivity, spontaneity and articulation
Abnormal as indicated, otherwise normal...
Normal volume, rate, productivity, spontaneity and articulation

## 2022-03-15 NOTE — BH PSYCHOLOGY - GROUP THERAPY NOTE - NSPSYCHOLGRPCOGPT_PSY_A_CORE
non-verbal/participated in exercise/other...

## 2022-03-15 NOTE — BH INPATIENT PSYCHIATRY PROGRESS NOTE - NSBHMSEAFFQUAL_PSY_A_CORE
Depressed/Irritable/Anxious
Euthymic
Depressed/Anxious
Depressed/Anxious
Euthymic
Euthymic
Irritable/Anxious
Euthymic/Anxious
Euthymic
Euthymic
Depressed/Irritable/Anxious

## 2022-03-15 NOTE — BH PSYCHOLOGY - GROUP THERAPY NOTE - NSBHPTASSESSDT_PSY_A_CORE
14-Mar-2022 10:15
11-Mar-2022 10:15
03-Mar-2022 10:15
08-Mar-2022 10:15
02-Mar-2022 10:15
07-Mar-2022 10:15
04-Mar-2022 10:15
10-Mar-2022 10:15
09-Mar-2022 10:15
15-Mar-2022 10:15

## 2022-03-15 NOTE — BH INPATIENT PSYCHIATRY PROGRESS NOTE - NSTXPSYCHODATEEST_PSY_ALL_CORE
09-Mar-2022
02-Mar-2022
09-Mar-2022
02-Mar-2022

## 2022-03-15 NOTE — BH INPATIENT PSYCHIATRY PROGRESS NOTE - NSTXDCOPNODATEEST_PSY_ALL_CORE
07-Mar-2022
07-Mar-2022
28-Feb-2022
07-Mar-2022
28-Feb-2022
07-Mar-2022
28-Feb-2022

## 2022-03-15 NOTE — BH INPATIENT PSYCHIATRY PROGRESS NOTE - NSBHFUPINTERVALHXFT_PSY_A_CORE
Pt compliant with medication and tolerating it well.  Chart reviewed and case discussed with treatment team.  No events reported overnight.  Pt without SI/HI/I/P or AH/VH.  Pt eating and sleeping well.  Pt continues to report she feels calmer on her medication.  Pt endorses motivation to continue medication as prescribed and engage in PHP.  Pt does not have any sanctions and has not been "suicided."  Pt reports she wants to live a normal life.  Pt plans on going to the gym and engaging in classes there for socialization.  Pt has long term goals of getting back to work.

## 2022-03-15 NOTE — BH INPATIENT PSYCHIATRY PROGRESS NOTE - NSBHINPTBILLING_PSY_ALL_CORE
92602 - Inpatient Moderate Complexity
98331 - Inpatient Moderate Complexity
28275 - Inpatient Low Complexity
37914 - Hospital Discharge Day Management; 30 min or less
29155 - Inpatient Moderate Complexity
74125 - Inpatient Moderate Complexity
95092 - Inpatient Moderate Complexity
36401 - Inpatient Moderate Complexity
76781 - Inpatient Moderate Complexity
86028 - Inpatient Moderate Complexity
26194 - Inpatient Moderate Complexity
11146 - Inpatient Moderate Complexity
92292 - Inpatient Low Complexity

## 2022-03-15 NOTE — BH INPATIENT PSYCHIATRY PROGRESS NOTE - NSBHASSESSSUMMFT_PSY_ALL_CORE
Pt calm today, improving delusions    Plan:  >Legal: 9.39  >Obs: Routine checks appropriate--visible on the unit, no history of aggression on inpatient unit.  No current SI. No need for CO, patient not expected to pose risk to self or others in controlled inpatient setting.  >Psychiatric Meds: outpatient medication regimen: Effexor XR titrated to 150mg PO daily, Invega 9mg PO at bedtime on 3/12  > Labs: Admission labs were reviewed, unremarkable. Follow up with Medical team as needed.   >Medical Comorbidities: No acute concerns. No consultations needed at this time. Patient with consistently stable VS,. Admission labs reviewed, no acute findings.   >Diet: Regular  >Social: milieu therapy  >Treatment Interventions: Groups and Individual Therapy/CBT, Motivational counseling for substance abuse related issues.   >Dispo: Pt safe to discharge today with PHP referral

## 2022-03-15 NOTE — BH INPATIENT PSYCHIATRY DISCHARGE NOTE - HPI (INCLUDE ILLNESS QUALITY, SEVERITY, DURATION, TIMING, CONTEXT, MODIFYING FACTORS, ASSOCIATED SIGNS AND SYMPTOMS)
Patient was seen and evaluated, chart reviewed. Case discussed with nursing team.  On service for this 36 year old  female domiciled with spouse in [place], unemployed with a past psych hx of Schizoaffective disorder, and PTSD.  Patient is hospitalized with psychotic decompensation, increase in bizarre and delusional behaviors. Patient admitted to  on a 9.39 legal status. I have reviewed the initial psychiatric assessment in the electronic medical record, including the history of present illness, past psychiatric history, family/social history (no pertinent changes), and exam, and have confirmed the salient findings dated 22.    As per chart review, transferring records indicated the followinYR old  F, domiciled with spouse in [place], unemployed with a past psych hx of schizoaffective disorder, PTSD, multiple hospitalizations, Last Hospitalization was at [facility] in Oct 2021 for a week, currently in outpatient treatment at [facility] in [place], [place] no past SA, no nssib, no medical hx, no substance use was BIB family for bizarre and delusional behavior.   Patient is labile and tangential during the interview. She starts crying when discussing the government sanctions that have been placed on her and it's leading her to possibly have a "forced suicide". She states the government has placed sanctions on her so she would take her medication and she was ordered by them to come to this hospital. She also admits that she only recently started taking her medication (Effexor 37.5mg and Abilify 15mg po) as she had been off of them for a long time because of "the health issues that she could get from them". Patient states that the government is also watching her through difference cameras planted in her home and around her home. She states they've been controlling her movements (unclear how) and they will "paralyze her legs" or "cause her to shake". Patient states they're inflicting psychological warfare on her and "the war is so severe" but does not state it's been making her feel depressed.   Patient admits that when she was not taking her medications she was not sleeping but since restarting both the medications her sleep has improved. She is endorsing grandiose delusions stating that "I am Covid" and says everytime someone mentions COVID on tv, they're talking about her. She also feels that the COVID vaccine has AIDS in it and will refuse to get it. Patient is denying any SI/I/P at this time and is also denying any HI/I/P. She did at a point, look up to the ceiling and said "STOP PARALYZING ME!" but denied any auditory hallucinations.       On unit:  Initial interview, patient is followed up for psychotic decompensation.  Chart, medications and admission labs reviewed, with no acute findings.  Patient is discussed with nursing staff. Per nursing report patient remains compliant with all standing medications. Patient remains in good behavioral control, there has been no aggression, no prns for aggression. No significant overnight issues.    Patient is observed in dayroom, walking in holding her bags of belongings.  She is notably guarded upon approach, but agrees to interview. When questioned about her mood pt reports that she feels “anxious”  In regards to psychotic symptoms, there is an extended pause then pt reports “I don’t have a psychiatric problem”.  Attempted to discuss precipitating events leading to current admission and why she is here pt replies “I have sanctions and there severe sanctions from the government”  Patient immediately becomes tearful, she is labile and tangential during the interview. She starts crying when discussing the government sanctions that have been placed on her. She states the government has placed sanctions on her so she would take her medication and she was ordered by the government to come to this hospital. Patient reports “there’s a war and I’m in the middle of the war” Patient is asked between who, she replies “[place] and all the celebrities they play this game and they use me as a shield and if they stop I’m going to die”   Patient reports the government is also involved in this war and it's leading her to possibly have a "forced suicide". Patient reports that this “war” has been going on for several months “they programed my mind to think about these games” Denies hearing voices. Patient is asked why she thinks she was picked to play these games, to which she replies “because I understand the government in a different way than most people, I know special things, I speak in special code” Patient abruptly stops talking then states “I’m not telling you anymore information about that” Patient then begins to shake her body violently in the chair stating “this is them controlling controling my body, whenever they hear me talking to someone they make me have a seizure so I can stop talking”   Regarding her medication compliance pt reports “I was ordered by the government by the feds to take them because the celebrities complained about me breathing the science”  Patient denies SI/SIB/HI “I would never kill myself  unless the government approved it”  Denies substance abuse. Attempts made to discuss alternative antipsychotic medications, pt reports that her current medications "are approved sanctions by the government, they kept their promise I do't want to change it"

## 2022-03-15 NOTE — BH INPATIENT PSYCHIATRY PROGRESS NOTE - NSTXPSYCHODATETRGT_PSY_ALL_CORE
16-Mar-2022
09-Mar-2022

## 2022-03-15 NOTE — BH INPATIENT PSYCHIATRY PROGRESS NOTE - NSBHCHARTREVIEWVS_PSY_A_CORE FT
Vital Signs Last 24 Hrs  T(C): 36.4 (03-15-22 @ 07:37), Max: 36.7 (03-14-22 @ 19:04)  T(F): 97.6 (03-15-22 @ 07:37), Max: 98 (03-14-22 @ 19:04)  HR: 78 (03-15-22 @ 07:37) (78 - 78)  BP: 101/65 (03-15-22 @ 07:37) (101/65 - 101/65)  BP(mean): --  RR: --  SpO2: --

## 2022-03-15 NOTE — BH PSYCHOLOGY - GROUP THERAPY NOTE - NSPSYCHOLGRPCOGPT_PSY_A_CORE FT
Patient attended recovery oriented/acceptance & commitment therapy group. Group started with present moment exercise – mindful breathing. Facilitator guided pts through mindful breath exercise to allow pts to turn their attention to the rate of their breathing. Processed reactions to activity – members reported feeling relaxed while others noted the challenge in staying present. Created space for all of pts’ reactions and offered psychoeducation that focusing on their breath isn’t meant to leave them feeling a “right” way. Explored how pts could utilize breathing as a form of being present in their daily lives. Transitioned to gratitude as a form of wellness. Pts reported feeling grateful for time in the group, their lives, Church, small acts of kindness. Session concluded with sensory activity where pts used their sense of smell to focus on sensation/smell of essential oils.   facilitated discussion of concepts, encouraged active participation, and supported members providing feedback to peers.
Patient attended recovery oriented/acceptance & commitment therapy group.  Group started with check in prompt to encourage social interaction (members introduced their partner and shared what kind of animal they would be & why). Members paired off and were observed positively interacting with each other. Group then reviewed a bullseye values assessment to help members identify whether their behavior is in line with where they want to be (in terms of relationships, health, work/education). Members shared their responses with several members identifying that work/education was farthest from the bullseye. Explored barriers to values based action - lack of energy, procrastination. Members reflected on specific actions they can take toward a specific value. Members expressed action items like applying for a job, reconnecting with old friends, and going to a workout class.  facilitated discussion of concepts, encouraged active participation, and supported members providing feedback to peers.  
Patient attended recovery oriented/acceptance & commitment therapy group. Group began with check in prompt to encourage group engagement (who is someone, living or dead, you would like to meet?). Members expressed various individuals including late family members, historical figures, and celebrities. Group then focused on meaning of acceptance. Group discussed acceptance through a beachball metaphor (consequences of pushing down a beachball, alternatives to resisting inner thoughts/feelings/sensations). Members reflected on challenges in acceptance and noted that there are some occasions when pushing the "beachball" under the water is effective in the moment (for certain urges). Led to conversation about societal expectations of showing emotions, particularly for males. Members thought about their own personal beach ball - certain members shared that their beach ball includes anxiety, their mental illness, and their current circumstance (admitted to an inpatient unit). Members considered benefits of accepting their beach ball and were encouraged to imagine what it might feel like to allow their "beach ball" to float to the surface.  facilitated discussion of concepts, encouraged active participation, and supported members providing feedback to peers. 
Patient attended recovery oriented/acceptance & commitment therapy group. Group started with mindful maze activity where members used a writing utensil or their finger to go through a labyrinth. Members were instructed to practice mindfulness (stay present, breathe, bring attention to maze) as they followed the labyrinth. Group then engaged in mindful origami activity where members followed instructions to create an origami bird. Members closely attended to steps of creating an origami bird. Group then explored purpose of values and discussed the importance of values in guiding our decisions. Members wrote down a set of personal values on the wings of their origami bird and we discussed the values (love, peace, family, independence, etc.).  asked members to focus on one personal value today and to use the selected value to guide their actions.  facilitated discussion of concepts, encouraged active participation, and supported members providing feedback to peers.
Patient attended recovery oriented/acceptance & commitment therapy group. Group started with cognitive exercise to encourage members ability to concentrate on one task. Members each identified one item to bring with them on a "beach trip" and recalled items mentioned by other members. Discussed cognitive strategies they used like associating the item with the member who said the item. Group then focused on values and acceptance. Group reviewed "values" image of an individual holding balloons with "self doubt," "worries," and "ugly memories" while walking toward their valued direction. The group actively discussed their reflections on moving toward values while still holding onto baggage. Members added their own balloons and specific their valued direction in life.  facilitated discussion of concepts, encouraged active participation, and supported members providing feedback to peers.
Patient attended recovery oriented/acceptance & commitment therapy group. Group began with light stretching and square breathing. Writer offered psychoeducation about breathing and the nervous system. Members were guided through brief breathing relaxation exercise. Group then focused on cognitive defusion and the power that thoughts can have on us. Leader provided members with 2 images - (1) a person in "thought assisted" as evidenced by each assisted bar representing a negative thought (not good enough, unworthy, should, must) and (2) the dog's perspective of image 1. Attempted to illustrate notion that thoughts hold us back insofar as we allow them. Group wondered why the 2nd image used a dog rather than another person's perspective. Members shared their thoughts including that dogs offer unconditional positive regard and acceptance and are unable to feel held back by negative thoughts.  facilitated discussion of concepts, encouraged active participation, and supported members providing feedback to peers. Session concluded with mindful attention activity. 
Patient attended recovery oriented/acceptance & commitment therapy group. Group started with check in prompt (what do you say to yourself when you're having a difficult day). Members said things like, "just bear it," "this too shall pass," and "this can't last forever." Other members noted engaging in activities like walking or using the computer. Group then focused on concept of dialectic. Members listened to and read from handout "opposites sides that can both be true." Explored dialectic of acceptance/change, having positive and negative feelings toward someone, feeling lonely in a crowd, as well as others. Members shared their reflections, noting that two perspectives can both be true. Encouraged members to consider how to use these ideas in dealing with seemingly contradictory feelings or thoughts.  facilitated discussion of concepts, encouraged active participation, and supported members providing feedback to peers. Session concluded with chalkboard activity where members wrote out their affirmations and encouraged one another.
Patient attended recovery oriented/acceptance & commitment therapy group. Group began with ice breaker exercise to encourage social interaction. Members were paired together and they introduced their partner to the group (said their name and one fact about them). Members reported enjoying these form of introduction and shared they felt more connected to one another. Group focused on values that are important in terms of self-care, family/friends, work/education, and health. Members reflected on behaviors that embody their values like hygiene, exercise, spending quality time with others, and setting consistent schedules. Explored barriers to acting on these values like lack of energy and ways of overcoming barriers.  facilitated discussion of concepts, encouraged active participation, and supported members providing feedback to peers. Session concluded with mindful listening activity. 
Patient attended recovery oriented/acceptance & commitment therapy group.  Group started with check in prompt (something you would love to learn to do). Members shared their interests in healthy communication, learning an instrument, riding a bike, driving a car, earning high school diploma etc. Reflected on action of setting goals and how doing so can serve as motivation to pursue taking action. Group then focused on concepts of cognitive defusion and values. Discussed junk mail metaphor to illustrate that all thoughts that enter our minds do not have to be given the same level of attention. Group reviewed selection of thoughts they would like to keep vs thoughts that do not serve them. Explored using values system (does this serve to move me in the direction I want or move me away from that direction) to guide decision making about our thoughts.  facilitated discussion of concepts, encouraged active participation, and supported members providing feedback to peers.

## 2022-03-15 NOTE — BH SAFETY PLAN - STEP 4 ASK HELP NAME
----- Message from Jessica Bingham sent at 9/21/2017  2:31 PM CDT -----  Contact: Patient   X _1st Request  _  2nd Request  _  3rd Request    Who:AMYLIN VEE [[id number]]    Why:Patient is requesting orders for her annual mammogram     What Number to Call Back:[phone]    When to Expect a call back: (Before the end of the day)   -- if call after 3:00 call back will be tomorrow.      Done GH   .

## 2022-07-01 NOTE — BH INPATIENT PSYCHIATRY PROGRESS NOTE - CURRENT MEDICATION
Vacuum Extraction was not used MEDICATIONS  (STANDING):  ARIPiprazole 10 milliGRAM(s) Oral daily  paliperidone ER 6 milliGRAM(s) Oral at bedtime  venlafaxine  milliGRAM(s) Oral daily    MEDICATIONS  (PRN):  clonazePAM  Tablet 1 milliGRAM(s) Oral two times a day PRN anxiety  diphenhydrAMINE 50 milliGRAM(s) Oral every 6 hours PRN eps ppx/agitation  diphenhydrAMINE Injectable 50 milliGRAM(s) IntraMuscular once PRN eps ppx/severe agitationeps ppx/severe agitation  haloperidol     Tablet 5 milliGRAM(s) Oral every 6 hours PRN agitation  haloperidol    Injectable 5 milliGRAM(s) IntraMuscular once PRN severe agitation  LORazepam   Injectable 2 milliGRAM(s) IntraMuscular once PRN agitation

## 2022-07-07 ENCOUNTER — ESTABLISHED COMPREHENSIVE EXAM (OUTPATIENT)
Dept: URBAN - METROPOLITAN AREA CLINIC 82 | Facility: CLINIC | Age: 37
End: 2022-07-07

## 2022-07-07 DIAGNOSIS — H52.13: ICD-10-CM

## 2022-07-07 PROCEDURE — 92310 CONTACT LENS FITTING OU: CPT

## 2022-07-07 PROCEDURE — 92015 DETERMINE REFRACTIVE STATE: CPT

## 2022-07-07 PROCEDURE — 92014 COMPRE OPH EXAM EST PT 1/>: CPT

## 2022-07-07 ASSESSMENT — VISUAL ACUITY
OD_CC: 20/20-1
OS_CC: 20/25

## 2022-07-07 ASSESSMENT — TONOMETRY
OD_IOP_MMHG: 17
OS_IOP_MMHG: 18

## 2023-05-02 NOTE — ED ADULT TRIAGE NOTE - SPO2 (%)
I saw and evaluated the patient. I discussed the patient's case with the resident. I agree with the Resident's findings and plan, as documented in today's note.      Golden Orlando, DO  Family Medicine       100

## 2023-08-30 NOTE — BH INPATIENT PSYCHIATRY PROGRESS NOTE - NSBHFUPINTERVALHXFT_PSY_A_CORE
Pt compliant with medication and tolerating it well.  Chart reviewed and case discussed with treatment team.  No events reported overnight.  Pt reports the government is doing "autistic science" in the groups on the unit.  Pt feels the government is torturing her and she cannot escape the "games" in the hospital and feels she would be better off at home.  Pt reports she is at risk for AIDS and "cancer closure" here.  Pt reports this morning she was "suicided" by the government.  Pt without active thoughts of SI/I/P.  Pt reports she cannot beg for them to stop because it is dangerous for her as she feels government information will get out.  Pt reports she cannot talk about the "games" because the government will kill her and they are using her as a shield for war.  Pt reports they are going to "Talha Amari me and close my cancer, this hospital is set up for games."  Pt reports feeling depressed because of the government. Bactrim Pregnancy And Lactation Text: This medication is Pregnancy Category D and is known to cause fetal risk.  It is also excreted in breast milk.

## 2023-12-12 ENCOUNTER — PROBLEM (OUTPATIENT)
Dept: URBAN - METROPOLITAN AREA CLINIC 82 | Facility: CLINIC | Age: 38
End: 2023-12-12

## 2023-12-12 DIAGNOSIS — H16.141: ICD-10-CM

## 2023-12-12 PROCEDURE — 92012 INTRM OPH EXAM EST PATIENT: CPT

## 2023-12-12 ASSESSMENT — VISUAL ACUITY
OD_CC: 20/30
OD_CC: J1
OS_CC: J1
OS_CC: 20/20

## 2024-01-04 ENCOUNTER — ESTABLISHED COMPREHENSIVE EXAM (OUTPATIENT)
Dept: URBAN - METROPOLITAN AREA CLINIC 82 | Facility: CLINIC | Age: 39
End: 2024-01-04

## 2024-01-04 DIAGNOSIS — H52.223: ICD-10-CM

## 2024-01-04 DIAGNOSIS — H52.13: ICD-10-CM

## 2024-01-04 PROCEDURE — 92014 COMPRE OPH EXAM EST PT 1/>: CPT

## 2024-01-04 PROCEDURE — 92015 DETERMINE REFRACTIVE STATE: CPT

## 2024-01-04 PROCEDURE — 92310 CONTACT LENS FITTING OU: CPT

## 2024-01-04 ASSESSMENT — TONOMETRY
OD_IOP_MMHG: 16
OS_IOP_MMHG: 16

## 2024-01-04 ASSESSMENT — VISUAL ACUITY
OS_CC: 20/30
OD_CC: 20/30-1
OS_CC: J1-1
OD_CC: J1+

## 2025-08-22 ENCOUNTER — ESTABLISHED COMPREHENSIVE EXAM (OUTPATIENT)
Dept: URBAN - METROPOLITAN AREA CLINIC 94 | Facility: CLINIC | Age: 40
End: 2025-08-22

## 2025-08-22 DIAGNOSIS — H52.13: ICD-10-CM

## 2025-08-22 PROCEDURE — 92014 COMPRE OPH EXAM EST PT 1/>: CPT

## 2025-08-22 PROCEDURE — 92310 CONTACT LENS FITTING OU: CPT

## 2025-08-22 PROCEDURE — 92015 DETERMINE REFRACTIVE STATE: CPT

## 2025-08-22 ASSESSMENT — TONOMETRY
OD_IOP_MMHG: 20
OS_IOP_MMHG: 19
OS_IOP_MMHG: 18
OD_IOP_MMHG: 18

## 2025-08-22 ASSESSMENT — VISUAL ACUITY
OU_CC: 20/20
OD_CC: 20/25
OS_CC: 20/20
OU_CC: 20/20

## (undated) RX ORDER — NEOMYCIN SULFATE, POLYMYXIN B SULFATE AND DEXAMETHASONE SUSPENSION/ DROPS 1; 3.5; 1 MG/ML; MG/ML; [USP'U]/ML: 1 SUSPENSION/ DROPS TOPICAL TWICE A DAY